# Patient Record
Sex: FEMALE | Race: BLACK OR AFRICAN AMERICAN | NOT HISPANIC OR LATINO | Employment: PART TIME | URBAN - METROPOLITAN AREA
[De-identification: names, ages, dates, MRNs, and addresses within clinical notes are randomized per-mention and may not be internally consistent; named-entity substitution may affect disease eponyms.]

---

## 2021-08-09 ENCOUNTER — OFFICE VISIT (OUTPATIENT)
Dept: FAMILY MEDICINE CLINIC | Facility: CLINIC | Age: 21
End: 2021-08-09

## 2021-08-09 VITALS
OXYGEN SATURATION: 98 % | HEIGHT: 64 IN | SYSTOLIC BLOOD PRESSURE: 122 MMHG | HEART RATE: 70 BPM | BODY MASS INDEX: 39.61 KG/M2 | TEMPERATURE: 97.6 F | WEIGHT: 232 LBS | RESPIRATION RATE: 18 BRPM | DIASTOLIC BLOOD PRESSURE: 86 MMHG

## 2021-08-09 DIAGNOSIS — G89.29 CHRONIC PAIN OF BOTH KNEES: ICD-10-CM

## 2021-08-09 DIAGNOSIS — Z76.89 ENCOUNTER TO ESTABLISH CARE: Primary | ICD-10-CM

## 2021-08-09 DIAGNOSIS — M25.562 CHRONIC PAIN OF BOTH KNEES: ICD-10-CM

## 2021-08-09 DIAGNOSIS — M25.561 CHRONIC PAIN OF BOTH KNEES: ICD-10-CM

## 2021-08-09 PROCEDURE — 99203 OFFICE O/P NEW LOW 30 MIN: CPT | Performed by: NURSE PRACTITIONER

## 2021-08-09 NOTE — PATIENT INSTRUCTIONS
Cont f/u with ortho and PT as scheduled.   Cont low impact exercise, ice, elevation, knee brace as needed.   May cont ibuprofen as needed for pain.   Deferred labs today, cont f/u with GYN as scheduled.   Patient agrees with plan of care and understands instructions. Call if worsening symptoms or any problems or concerns.

## 2021-08-09 NOTE — PROGRESS NOTES
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 She does exercise, states diet is good. Denies smoking. Denies hx of asthma or murmur, she is fasting today.   Sees GYN Dr. Posadas, last visit 6/2021. LMP 7/24/2021, states regular, now more painful, she is not taking OCP.   C/o bilat knee pain L>R, she states she saw ortho Dr. Sutton, [facility] ortho and is going to PT. Last visit 7/29/2021, and recommended to continue with PT for foot and ankle strengthening. She states she has patella tendon pain, she states she has degenerative joint. She was released to work, she states PT does help but still having pain. She states pain throughout the day, improved with activity.       Objective   Vital Signs:   /86 (BP Location: Left arm, Patient Position: Sitting)   Pulse 70   Temp 97.6 °F (36.4 °C) (Infrared)   Resp 18   Ht 162.6 cm (64\")   Wt 105 kg (232 lb)   SpO2 98%   BMI 39.82 kg/m²     Physical Exam  Vitals and nursing note reviewed.   Constitutional:       Appearance: She is well-developed.   HENT:      Head: Normocephalic.   Eyes:      Pupils: Pupils are equal, round, and reactive to light.   Cardiovascular:      Rate and Rhythm: Normal rate and regular rhythm.      Heart sounds: Normal heart sounds.   Pulmonary:      Effort: Pulmonary effort is normal.      Breath sounds: Normal breath sounds.   Musculoskeletal:      Right knee: Swelling present. No effusion. Normal range of motion. Tenderness present over the patellar tendon. Normal pulse.      Left knee: Swelling present. No effusion. Normal range of motion. Tenderness present over the patellar tendon. Normal pulse.   Skin:     General: " Skin is warm and dry.   Neurological:      Mental Status: She is alert and oriented to person, place, and time.   Psychiatric:         Behavior: Behavior normal.         Judgment: Judgment normal.        Result Review :                 Assessment and Plan    Diagnoses and all orders for this visit:    1. Encounter to establish care (Primary)    2. Chronic pain of both knees        Follow Up   Return if symptoms worsen or fail to improve, for Annual physical.  Patient was given instructions and counseling regarding her condition or for health maintenance advice. Please see specific information pulled into the AVS if appropriate.     Cont f/u with ortho and PT as scheduled.   Cont low impact exercise, ice, elevation, knee brace as needed.   May cont ibuprofen as needed for pain.   Deferred labs today, cont f/u with GYN as scheduled.   Patient agrees with plan of care and understands instructions. Call if worsening symptoms or any problems or concerns.

## 2022-08-09 ENCOUNTER — OFFICE VISIT (OUTPATIENT)
Dept: FAMILY MEDICINE CLINIC | Facility: CLINIC | Age: 22
End: 2022-08-09

## 2022-08-09 VITALS
RESPIRATION RATE: 18 BRPM | HEART RATE: 74 BPM | BODY MASS INDEX: 42.43 KG/M2 | SYSTOLIC BLOOD PRESSURE: 140 MMHG | OXYGEN SATURATION: 98 % | WEIGHT: 264 LBS | TEMPERATURE: 97.3 F | DIASTOLIC BLOOD PRESSURE: 80 MMHG | HEIGHT: 66 IN

## 2022-08-09 DIAGNOSIS — N92.0 MENORRHAGIA WITH REGULAR CYCLE: ICD-10-CM

## 2022-08-09 DIAGNOSIS — R53.83 FATIGUE, UNSPECIFIED TYPE: ICD-10-CM

## 2022-08-09 DIAGNOSIS — F41.9 ANXIETY AND DEPRESSION: ICD-10-CM

## 2022-08-09 DIAGNOSIS — Z11.3 SCREEN FOR STD (SEXUALLY TRANSMITTED DISEASE): ICD-10-CM

## 2022-08-09 DIAGNOSIS — Z13.6 SCREENING, ISCHEMIC HEART DISEASE: ICD-10-CM

## 2022-08-09 DIAGNOSIS — N94.3 PMS (PREMENSTRUAL SYNDROME): ICD-10-CM

## 2022-08-09 DIAGNOSIS — Z00.00 ANNUAL PHYSICAL EXAM: Primary | ICD-10-CM

## 2022-08-09 DIAGNOSIS — F32.A ANXIETY AND DEPRESSION: ICD-10-CM

## 2022-08-09 PROCEDURE — 99395 PREV VISIT EST AGE 18-39: CPT | Performed by: NURSE PRACTITIONER

## 2022-08-09 NOTE — PROGRESS NOTES
"Chief Complaint  Wellness Check (Mental and physical health possible pap/seen gyn 2021 results in care everywhere ) and Back Pain    Subjective        Jessica Gauthier presents to [facility]  Patient presents to the office today an annual physical exam.  Patient reports heavy menstrual periods.  She reports taking Motrin for cramping.  Patient will make a follow-up visit with OB/GYN.  Patient is due for physical annual lab work today.  Patient denies any chest pain shortness of air.  She does report intermittent fatigue.  I will check thyroid level for this.  She has anxiety and depression without suicidal or homicidal ideation.  She is stable without medication.  Blood pressure today is 140/80.              Back Pain        Objective   Vital Signs:  /80 (BP Location: Left arm, Patient Position: Sitting)   Pulse 74   Temp 97.3 °F (36.3 °C) (Infrared)   Resp 18   Ht 166.4 cm (65.5\")   Wt 120 kg (264 lb)   SpO2 98%   BMI 43.26 kg/m²   Estimated body mass index is 43.26 kg/m² as calculated from the following:    Height as of this encounter: 166.4 cm (65.5\").    Weight as of this encounter: 120 kg (264 lb).    Class 3 Severe Obesity (BMI >=40). Obesity-related health conditions include the following: none. Obesity is unchanged. BMI is is above average; BMI management plan is completed. We discussed portion control and increasing exercise.      Physical Exam  Constitutional:       General: She is not in acute distress.     Appearance: Normal appearance. She is not ill-appearing, toxic-appearing or diaphoretic.   HENT:      Head: Normocephalic.      Right Ear: Tympanic membrane and external ear normal. There is no impacted cerumen.      Left Ear: Tympanic membrane and external ear normal. There is no impacted cerumen.      Nose: Nose normal. No congestion or rhinorrhea.      Mouth/Throat:      Mouth: Mucous membranes are moist.      Pharynx: Oropharynx is clear. No oropharyngeal " exudate or posterior oropharyngeal erythema.   Eyes:      General:         Right eye: No discharge.         Left eye: No discharge.      Extraocular Movements: Extraocular movements intact.      Conjunctiva/sclera: Conjunctivae normal.      Pupils: Pupils are equal, round, and reactive to light.   Neck:      Vascular: No carotid bruit.   Cardiovascular:      Rate and Rhythm: Normal rate and regular rhythm.      Pulses: Normal pulses.      Heart sounds: Normal heart sounds. No murmur heard.    No friction rub. No gallop.   Pulmonary:      Effort: Pulmonary effort is normal. No respiratory distress.      Breath sounds: Normal breath sounds. No wheezing, rhonchi or rales.   Chest:      Chest wall: No tenderness.   Abdominal:      General: Abdomen is flat. Bowel sounds are normal. There is no distension.      Palpations: Abdomen is soft. There is no mass.      Tenderness: There is no abdominal tenderness. There is no guarding or rebound.      Hernia: No hernia is present.   Musculoskeletal:         General: No swelling or tenderness. Normal range of motion.      Cervical back: Normal range of motion and neck supple. No tenderness.   Skin:     General: Skin is warm and dry.      Coloration: Skin is not jaundiced or pale.      Findings: No erythema or rash.   Neurological:      Mental Status: She is alert and oriented to person, place, and time.      Sensory: No sensory deficit.      Motor: No weakness.      Gait: Gait normal.   Psychiatric:         Mood and Affect: Mood is anxious. Mood is not depressed.         Behavior: Behavior normal.         Thought Content: Thought content normal.         Judgment: Judgment normal.        Result Review :  The following data was reviewed by: PHIL Guerra on 08/09/2022:  Common labs    Common Labsle 8/16/22   WBC 5.20   Hemoglobin 12.6   Hematocrit 38.9   Platelets 410                    Assessment and Plan   Diagnoses and all orders for this visit:    1. Annual physical  exam (Primary)  -     CBC & Differential; Future  -     Comprehensive Metabolic Panel; Future  -     Lipid Panel; Future  -     TSH; Future    2. Menorrhagia with regular cycle  -     CBC & Differential; Future    3. PMS (premenstrual syndrome)    4. Screen for STD (sexually transmitted disease)  -     Chlamydia trachomatis, Neisseria gonorrhoeae, PCR - , Urine, Clean Catch; Future  -     HSV 2 Antibody, IgG; Future  -     RPR; Future  -     Urinalysis With Microscopic - Urine, Clean Catch; Future  -     HIV-1 / O / 2 Ag / Antibody 4th Generation; Future  -     HSV 1 Antibody, IgG; Future    5. Fatigue, unspecified type  -     TSH; Future    6. Screening, ischemic heart disease    7. Anxiety and depression    Counseling was provided on nutrition, physical activity, development, and injury prevention, dental health, and safe sex practices patient verbalizes understanding no additional questions were asked.  Patient to follow-up with OB/GYN for menorrhagia  Patient to work on healthy diet and exercise, limit caffeine.  Advised patient to start yoga and deep breathing exercises for anxiety and depression.       I spent 30 minutes caring for Jessica on this date of service. This time includes time spent by me in the following activities:preparing for the visit, performing a medically appropriate examination and/or evaluation , counseling and educating the patient/family/caregiver, ordering medications, tests, or procedures and documenting information in the medical record  Follow Up   Return in about 1 month (around 9/9/2022), or bring in BP log.  Patient was given instructions and counseling regarding her condition or for health maintenance advice. Please see specific information pulled into the AVS if appropriate.

## 2022-08-16 ENCOUNTER — LAB (OUTPATIENT)
Dept: FAMILY MEDICINE CLINIC | Facility: CLINIC | Age: 22
End: 2022-08-16

## 2022-08-16 DIAGNOSIS — Z11.3 SCREEN FOR STD (SEXUALLY TRANSMITTED DISEASE): ICD-10-CM

## 2022-08-16 DIAGNOSIS — Z00.00 ANNUAL PHYSICAL EXAM: ICD-10-CM

## 2022-08-16 DIAGNOSIS — N92.0 MENORRHAGIA WITH REGULAR CYCLE: ICD-10-CM

## 2022-08-16 DIAGNOSIS — R53.83 FATIGUE, UNSPECIFIED TYPE: ICD-10-CM

## 2022-08-16 LAB
ALBUMIN SERPL-MCNC: 4.3 G/DL (ref 3.5–5.2)
ALBUMIN/GLOB SERPL: 1.8 G/DL
ALP SERPL-CCNC: 91 U/L (ref 39–117)
ALT SERPL W P-5'-P-CCNC: 14 U/L (ref 1–33)
ANION GAP SERPL CALCULATED.3IONS-SCNC: 8.9 MMOL/L (ref 5–15)
AST SERPL-CCNC: 13 U/L (ref 1–32)
BACTERIA UR QL AUTO: ABNORMAL /HPF
BILIRUB SERPL-MCNC: <0.2 MG/DL (ref 0–1.2)
BILIRUB UR QL STRIP: NEGATIVE
BUN SERPL-MCNC: 11 MG/DL (ref 6–20)
BUN/CREAT SERPL: 13.4 (ref 7–25)
CALCIUM SPEC-SCNC: 9.4 MG/DL (ref 8.6–10.5)
CHLORIDE SERPL-SCNC: 105 MMOL/L (ref 98–107)
CHOLEST SERPL-MCNC: 263 MG/DL (ref 0–200)
CLARITY UR: CLEAR
CO2 SERPL-SCNC: 22.1 MMOL/L (ref 22–29)
COLOR UR: YELLOW
CREAT SERPL-MCNC: 0.82 MG/DL (ref 0.57–1)
EGFRCR SERPLBLD CKD-EPI 2021: 104.5 ML/MIN/1.73
ERYTHROCYTE [DISTWIDTH] IN BLOOD BY AUTOMATED COUNT: 14.5 % (ref 12.3–15.4)
GLOBULIN UR ELPH-MCNC: 2.4 GM/DL
GLUCOSE SERPL-MCNC: 93 MG/DL (ref 65–99)
GLUCOSE UR STRIP-MCNC: NEGATIVE MG/DL
HCT VFR BLD AUTO: 38.9 % (ref 34–46.6)
HDLC SERPL-MCNC: 51 MG/DL (ref 40–60)
HGB BLD-MCNC: 12.6 G/DL (ref 12–15.9)
HGB UR QL STRIP.AUTO: ABNORMAL
HIV1+2 AB SER QL: NORMAL
KETONES UR QL STRIP: NEGATIVE
LDLC SERPL CALC-MCNC: 202 MG/DL (ref 0–100)
LDLC/HDLC SERPL: 3.91 {RATIO}
LEUKOCYTE ESTERASE UR QL STRIP.AUTO: NEGATIVE
LYMPHOCYTES # BLD AUTO: 2.4 10*3/MM3 (ref 0.7–3.1)
LYMPHOCYTES NFR BLD AUTO: 46.3 % (ref 19.6–45.3)
MCH RBC QN AUTO: 28.4 PG (ref 26.6–33)
MCHC RBC AUTO-ENTMCNC: 32.4 G/DL (ref 31.5–35.7)
MCV RBC AUTO: 87.7 FL (ref 79–97)
MONOCYTES # BLD AUTO: 0.2 10*3/MM3 (ref 0.1–0.9)
MONOCYTES NFR BLD AUTO: 4.2 % (ref 5–12)
NEUTROPHILS NFR BLD AUTO: 2.6 10*3/MM3 (ref 1.7–7)
NEUTROPHILS NFR BLD AUTO: 49.5 % (ref 42.7–76)
NITRITE UR QL STRIP: NEGATIVE
PH UR STRIP.AUTO: <=5 [PH] (ref 4.6–8)
PLATELET # BLD AUTO: 410 10*3/MM3 (ref 140–450)
PMV BLD AUTO: 7.4 FL (ref 6–12)
POTASSIUM SERPL-SCNC: 4.4 MMOL/L (ref 3.5–5.2)
PROT SERPL-MCNC: 6.7 G/DL (ref 6–8.5)
PROT UR QL STRIP: NEGATIVE
RBC # BLD AUTO: 4.43 10*6/MM3 (ref 3.77–5.28)
RBC # UR STRIP: ABNORMAL /HPF
REF LAB TEST METHOD: ABNORMAL
RPR SER QL: NORMAL
SODIUM SERPL-SCNC: 136 MMOL/L (ref 136–145)
SP GR UR STRIP: 1.02 (ref 1–1.03)
SQUAMOUS #/AREA URNS HPF: ABNORMAL /HPF
TRIGL SERPL-MCNC: 64 MG/DL (ref 0–150)
TSH SERPL DL<=0.05 MIU/L-ACNC: 2.36 UIU/ML (ref 0.27–4.2)
UROBILINOGEN UR QL STRIP: ABNORMAL
VLDLC SERPL-MCNC: 10 MG/DL (ref 5–40)
WBC # UR STRIP: ABNORMAL /HPF
WBC NRBC COR # BLD: 5.2 10*3/MM3 (ref 3.4–10.8)

## 2022-08-16 PROCEDURE — G0432 EIA HIV-1/HIV-2 SCREEN: HCPCS | Performed by: NURSE PRACTITIONER

## 2022-08-16 PROCEDURE — 80061 LIPID PANEL: CPT | Performed by: NURSE PRACTITIONER

## 2022-08-16 PROCEDURE — 81001 URINALYSIS AUTO W/SCOPE: CPT | Performed by: NURSE PRACTITIONER

## 2022-08-16 PROCEDURE — 36415 COLL VENOUS BLD VENIPUNCTURE: CPT | Performed by: NURSE PRACTITIONER

## 2022-08-16 PROCEDURE — 80053 COMPREHEN METABOLIC PANEL: CPT | Performed by: NURSE PRACTITIONER

## 2022-08-16 PROCEDURE — 86592 SYPHILIS TEST NON-TREP QUAL: CPT | Performed by: NURSE PRACTITIONER

## 2022-08-16 PROCEDURE — 85025 COMPLETE CBC W/AUTO DIFF WBC: CPT | Performed by: NURSE PRACTITIONER

## 2022-08-16 PROCEDURE — 84443 ASSAY THYROID STIM HORMONE: CPT | Performed by: NURSE PRACTITIONER

## 2022-08-17 LAB
C TRACH RRNA SPEC QL NAA+PROBE: NEGATIVE
HSV1 IGG SER IA-ACNC: <0.91 INDEX (ref 0–0.9)
HSV2 IGG SER IA-ACNC: <0.91 INDEX (ref 0–0.9)
N GONORRHOEA RRNA SPEC QL NAA+PROBE: NEGATIVE

## 2024-10-15 ENCOUNTER — OFFICE VISIT (OUTPATIENT)
Dept: OBSTETRICS AND GYNECOLOGY | Age: 24
End: 2024-10-15
Payer: COMMERCIAL

## 2024-10-15 VITALS
WEIGHT: 287.8 LBS | DIASTOLIC BLOOD PRESSURE: 82 MMHG | BODY MASS INDEX: 46.25 KG/M2 | SYSTOLIC BLOOD PRESSURE: 124 MMHG | HEIGHT: 66 IN

## 2024-10-15 DIAGNOSIS — F41.9 ANXIETY AND DEPRESSION: ICD-10-CM

## 2024-10-15 DIAGNOSIS — Z23 FLU VACCINE NEED: ICD-10-CM

## 2024-10-15 DIAGNOSIS — Z34.01 ENCOUNTER FOR SUPERVISION OF NORMAL FIRST PREGNANCY, FIRST TRIMESTER: ICD-10-CM

## 2024-10-15 DIAGNOSIS — O21.9 NAUSEA/VOMITING IN PREGNANCY: ICD-10-CM

## 2024-10-15 DIAGNOSIS — Z01.419 ENCOUNTER FOR GYNECOLOGICAL EXAMINATION WITHOUT ABNORMAL FINDING: Primary | ICD-10-CM

## 2024-10-15 DIAGNOSIS — F32.A ANXIETY AND DEPRESSION: ICD-10-CM

## 2024-10-15 DIAGNOSIS — Z34.91 PRENATAL CARE IN FIRST TRIMESTER: ICD-10-CM

## 2024-10-15 DIAGNOSIS — O99.210 OBESITY IN PREGNANCY, ANTEPARTUM: ICD-10-CM

## 2024-10-15 DIAGNOSIS — Z3A.01 7 WEEKS GESTATION OF PREGNANCY: ICD-10-CM

## 2024-10-15 DIAGNOSIS — E78.00 HYPERCHOLESTEROLEMIA: ICD-10-CM

## 2024-10-15 LAB
BILIRUB BLD-MCNC: NEGATIVE MG/DL
CLARITY, POC: CLEAR
COLOR UR: YELLOW
GLUCOSE UR STRIP-MCNC: NEGATIVE MG/DL
KETONES UR QL: NEGATIVE
LEUKOCYTE EST, POC: NEGATIVE
NITRITE UR-MCNC: NEGATIVE MG/ML
PH UR: 6.5 [PH] (ref 5–8)
PROT UR STRIP-MCNC: ABNORMAL MG/DL
RBC # UR STRIP: ABNORMAL /UL
SP GR UR: 1.03 (ref 1–1.03)
UROBILINOGEN UR QL: ABNORMAL

## 2024-10-15 RX ORDER — DIPHENHYDRAMINE HYDROCHLORIDE 25 MG/1
25 CAPSULE ORAL DAILY
Qty: 90 TABLET | Refills: 2 | Status: SHIPPED | OUTPATIENT
Start: 2024-10-15

## 2024-10-15 NOTE — ASSESSMENT & PLAN NOTE
Reviewed diet and exercise recommendations in pregnancy.  Reviewed goal total weight gain of 20 pounds.

## 2024-10-15 NOTE — PROGRESS NOTES
[facility]   Obstetrics and Gynecology   Routine Annual Visit    10/15/2024    Patient: Jessica Gauthier          MR#:[id number]    History of Present Illness    Chief Complaint   Patient presents with    Gynecologic Exam     New Gyn, last annual 22, US for fetal viability, LMP 24, pt c/o cramping, Hx of Endometriosis.     24 y.o. female  who presents for annual exam and viability US.    Patient is having nausea daily and has had a few episodes of vomiting.  She is not taking anything for this.  Does have baseline constipation but is having hard bowel movements around twice daily.  Denies vaginal bleeding, vaginal discharge.  Does have some pelvic discomfort and she has had difficulty knowing if this is related to her endometriosis or to early pregnancy.    Of note, father of baby entered foster care at the age of 10 and has limited family history.  He does note that he has 2 brothers with autism.    Pap 2022 NILM - in CareEverywhere    Obstetric History:  OB History          1    Para        Term                AB        Living             SAB        IAB        Ectopic        Molar        Multiple        Live Births                   Menstrual History:     Patient's last menstrual period was 2024 (exact date).       Sexual History:   Monogamous sexual relationship     Concern for IPV: denies  Dyspareunia: would have some after sex, not during, only sometimes, worse around time of menses / ovulation  Breast concerns: denies  Urinary incontinence: denies  Fecal/gas incontinence: denies  Concern for STI?: denies  ________________________________________  Patient Active Problem List   Diagnosis    Menorrhagia with regular cycle    PMS (premenstrual syndrome)    Screening, ischemic heart disease    Fatigue    Anxiety and depression     Past Medical History:   Diagnosis Date    Anxiety     regulating / getting better    Depression     Not currently. not diagnosed     "Endometriosis     diagnosed aug 2023. laparoscopic surgery    Fibroid     found in aug 2023 in laparoscopy    Hyperlipidemia Always seen this in my results from a young age    Migraine     chronic. not medicated    PMS (premenstrual syndrome)      Past Surgical History:   Procedure Laterality Date    FULGURATION ENDOMETRIOSIS  aug 24    laparoscopy    HYSTEROSCOPY      WISDOM TOOTH EXTRACTION  2019     Social History     Tobacco Use   Smoking Status Never   Smokeless Tobacco Not on file     Family History   Problem Relation Age of Onset    Kidney disease Father     Pancreatic cancer Maternal Grandmother     Diabetes Maternal Grandmother     Breast cancer Maternal Grandmother 50    Prostate cancer Maternal Grandfather         just surgery    Diabetes Maternal Grandfather     Breast cancer Maternal Aunt 40 - 49    Uterine cancer Neg Hx     Ovarian cancer Neg Hx     Colon cancer Neg Hx      Prior to Admission medications    Not on File     ________________________________________    Current contraception: none  History of abnormal Pap smear: denies  Received Gardasil immunization:  unsure - is going to ask mother  History of abnormal mammogram: n/a    Review of Systems   Constitutional:  Negative for chills and fever.   Gastrointestinal:  Positive for constipation, nausea and vomiting. Negative for abdominal pain and diarrhea.   Genitourinary:  Negative for dysuria, frequency, urgency, vaginal bleeding and vaginal discharge.          Objective     /82   Ht 166.4 cm (65.51\")   Wt 131 kg (287 lb 12.8 oz)   LMP 08/27/2024 (Exact Date)   BMI 47.15 kg/m²    BP Readings from Last 3 Encounters:   10/15/24 124/82   08/09/22 140/80   08/09/21 122/86      Wt Readings from Last 3 Encounters:   10/15/24 131 kg (287 lb 12.8 oz)   08/09/22 120 kg (264 lb)   08/09/21 105 kg (232 lb)        BMI: Estimated body mass index is 47.15 kg/m² as calculated from the following:    Height as of this encounter: 166.4 cm (65.51\").    " Weight as of this encounter: 131 kg (287 lb 12.8 oz).    Physical Exam  Vitals and nursing note reviewed.   Constitutional:       General: She is not in acute distress.     Appearance: Normal appearance.   HENT:      Head: Normocephalic and atraumatic.   Eyes:      Extraocular Movements: Extraocular movements intact.   Cardiovascular:      Rate and Rhythm: Normal rate and regular rhythm.   Pulmonary:      Effort: Pulmonary effort is normal. No respiratory distress.   Chest:      Comments: Declined by patient  Abdominal:      General: There is no distension.      Palpations: Abdomen is soft. There is no mass.      Tenderness: There is no abdominal tenderness.   Musculoskeletal:         General: No swelling. Normal range of motion.      Cervical back: Normal range of motion.   Skin:     General: Skin is warm and dry.   Neurological:      General: No focal deficit present.      Mental Status: She is alert and oriented to person, place, and time.   Psychiatric:         Mood and Affect: Mood normal.         Behavior: Behavior normal.       TVUS 10/15/2024   Impression    1.  Intrauterine pregnancy measuring 7w6d not consistent with LMP  2.  Viable fetus,   3.  EDC: Estimated Date of Delivery: 2025 by US today    Assessment:  Jessica Gauthier is a 24 y.o.  presenting for well woman exam and viability US.    Diagnoses and all orders for this visit:    1. Encounter for gynecological examination without abnormal finding (Primary)    2. Prenatal care in first trimester  -     Urine Culture - Urine, Urine, Clean Catch  -     Chlamydia trachomatis, Neisseria gonorrhoeae, Trichomonas vaginalis, PCR - Urine, Urine, Clean Catch  -     OB Panel With HIV and RPR  -     Varicella Zoster Antibody, IgG  -     Hemoglobin A1c  -     Hepatitis C Antibody  -     Rubella Antibody, IgG  -     Hemoglobinopathy Fractionation Cascade; Future  -     Hemoglobinopathy Fractionation Cascade    3. 7 weeks gestation of pregnancy  -     POC  Urinalysis Dipstick    4. Nausea/vomiting in pregnancy  Assessment & Plan:  Rx B6, unisom. Recommended amador prn.    Orders:  -     vitamin B-6 (PYRIDOXINE) 25 MG tablet; Take 1 tablet by mouth Daily.  Dispense: 90 tablet; Refill: 2  -     doxylamine (UNISOM) 25 MG tablet; Take 1 tablet by mouth Every Night.  Dispense: 60 tablet; Refill: 1    5. Flu vaccine need  -     Fluzone >6mos (6904-2019)    6. Hypercholesterolemia    7. Encounter for supervision of normal first pregnancy, first trimester  Overview:  -PNL: collect today  -Pap: 8/2022 NILM  -Genetics: CF/SMA/NIPS at next visit, plan for anatomy Us at 18-22 wga  -Imm: titers today, tdap > 27wga, counseled on covid vaccine  -Contraception: discuss at future visit  -Birthplan: discuss at future visit  -Care coordination: will discuss blood transfusions, latex allergy - rash, call schedule to be reviewed        8. Obesity in pregnancy, antepartum  Assessment & Plan:  Reviewed diet and exercise recommendations in pregnancy.  Reviewed goal total weight gain of 20 pounds.      9. Anxiety and depression  Overview:  Previously took hydroxyzine qhs prn. Reports mood stable currently.        Recommended flu/COVID vaccination. Flu vaccine given in office today.    Plan:  Return in about 4 weeks (around 11/12/2024) for initial OB.    Rachel Wilcox MD  10/15/2024 11:00 EDT

## 2024-10-16 PROBLEM — Z28.39 SUSCEPTIBLE TO VARICELLA (NON-IMMUNE), CURRENTLY PREGNANT: Status: ACTIVE | Noted: 2024-10-16

## 2024-10-16 PROBLEM — O09.899 SUSCEPTIBLE TO VARICELLA (NON-IMMUNE), CURRENTLY PREGNANT: Status: ACTIVE | Noted: 2024-10-16

## 2024-10-16 PROBLEM — R73.03 PREDIABETES: Status: ACTIVE | Noted: 2024-10-16

## 2024-10-16 LAB
ABO GROUP BLD: NORMAL
BASOPHILS # BLD AUTO: 0 X10E3/UL (ref 0–0.2)
BASOPHILS NFR BLD AUTO: 1 %
BLD GP AB SCN SERPL QL: NEGATIVE
EOSINOPHIL # BLD AUTO: 0.1 X10E3/UL (ref 0–0.4)
EOSINOPHIL NFR BLD AUTO: 1 %
ERYTHROCYTE [DISTWIDTH] IN BLOOD BY AUTOMATED COUNT: 13.4 % (ref 11.7–15.4)
HBA1C MFR BLD: 5.8 % (ref 4.8–5.6)
HBV SURFACE AG SERPL QL IA: NEGATIVE
HCT VFR BLD AUTO: 43.3 % (ref 34–46.6)
HCV AB SERPL QL IA: NORMAL
HCV IGG SERPL QL IA: NON REACTIVE
HCV IGG SERPL QL IA: NORMAL
HGB A MFR BLD ELPH: 97.6 % (ref 96.4–98.8)
HGB A2 MFR BLD ELPH: 2.4 % (ref 1.8–3.2)
HGB BLD-MCNC: 14 G/DL (ref 11.1–15.9)
HGB F MFR BLD ELPH: 0 % (ref 0–2)
HGB FRACT BLD-IMP: NORMAL
HGB S MFR BLD ELPH: 0 %
HIV 1+2 AB+HIV1 P24 AG SERPL QL IA: NON REACTIVE
IMM GRANULOCYTES # BLD AUTO: 0 X10E3/UL (ref 0–0.1)
IMM GRANULOCYTES NFR BLD AUTO: 0 %
LYMPHOCYTES # BLD AUTO: 1.8 X10E3/UL (ref 0.7–3.1)
LYMPHOCYTES NFR BLD AUTO: 29 %
MCH RBC QN AUTO: 29.5 PG (ref 26.6–33)
MCHC RBC AUTO-ENTMCNC: 32.3 G/DL (ref 31.5–35.7)
MCV RBC AUTO: 91 FL (ref 79–97)
MONOCYTES # BLD AUTO: 0.4 X10E3/UL (ref 0.1–0.9)
MONOCYTES NFR BLD AUTO: 6 %
NEUTROPHILS # BLD AUTO: 3.9 X10E3/UL (ref 1.4–7)
NEUTROPHILS NFR BLD AUTO: 63 %
PLATELET # BLD AUTO: 389 X10E3/UL (ref 150–450)
RBC # BLD AUTO: 4.75 X10E6/UL (ref 3.77–5.28)
RH BLD: POSITIVE
RPR SER QL: NON REACTIVE
RUBV IGG SERPL IA-ACNC: 3.01 INDEX
VZV IGG SER QL IA: NON REACTIVE
WBC # BLD AUTO: 6.2 X10E3/UL (ref 3.4–10.8)

## 2024-10-17 LAB
BACTERIA UR CULT: NORMAL
BACTERIA UR CULT: NORMAL
C TRACH RRNA SPEC QL NAA+PROBE: NEGATIVE
N GONORRHOEA RRNA SPEC QL NAA+PROBE: NEGATIVE
T VAGINALIS RRNA SPEC QL NAA+PROBE: NEGATIVE

## 2024-11-12 ENCOUNTER — INITIAL PRENATAL (OUTPATIENT)
Dept: OBSTETRICS AND GYNECOLOGY | Age: 24
End: 2024-11-12
Payer: COMMERCIAL

## 2024-11-12 VITALS — BODY MASS INDEX: 46.88 KG/M2 | DIASTOLIC BLOOD PRESSURE: 86 MMHG | SYSTOLIC BLOOD PRESSURE: 124 MMHG | WEIGHT: 286.2 LBS

## 2024-11-12 DIAGNOSIS — R73.03 PREDIABETES: ICD-10-CM

## 2024-11-12 DIAGNOSIS — Z34.01 ENCOUNTER FOR SUPERVISION OF NORMAL FIRST PREGNANCY, FIRST TRIMESTER: Primary | ICD-10-CM

## 2024-11-12 DIAGNOSIS — Z28.39 SUSCEPTIBLE TO VARICELLA (NON-IMMUNE), CURRENTLY PREGNANT: ICD-10-CM

## 2024-11-12 DIAGNOSIS — F41.9 ANXIETY AND DEPRESSION: ICD-10-CM

## 2024-11-12 DIAGNOSIS — E78.00 HYPERCHOLESTEROLEMIA: ICD-10-CM

## 2024-11-12 DIAGNOSIS — O09.899 SUSCEPTIBLE TO VARICELLA (NON-IMMUNE), CURRENTLY PREGNANT: ICD-10-CM

## 2024-11-12 DIAGNOSIS — F32.A ANXIETY AND DEPRESSION: ICD-10-CM

## 2024-11-12 DIAGNOSIS — N89.8 VAGINAL IRRITATION: ICD-10-CM

## 2024-11-12 DIAGNOSIS — O99.210 OBESITY IN PREGNANCY, ANTEPARTUM: ICD-10-CM

## 2024-11-12 LAB
CLARITY, POC: CLEAR
COLOR UR: YELLOW
EXTERNAL NIPT: NORMAL
GLUCOSE UR STRIP-MCNC: NEGATIVE MG/DL
PROT UR STRIP-MCNC: NEGATIVE MG/DL

## 2024-11-12 RX ORDER — ASPIRIN 81 MG/1
81 TABLET ORAL DAILY
Qty: 90 TABLET | Refills: 1 | Status: SHIPPED | OUTPATIENT
Start: 2024-11-12

## 2024-11-12 NOTE — PROGRESS NOTES
"[facility]   Obstetrics and Gynecology   New Obstetric Visit    2024    Patient: Jessica Gauthier          MR#:[id number]    History of Present Illness    Chief Complaint   Patient presents with    Initial Prenatal Visit     OB check, Genetic testing, pt has no complaints today     24 y.o. female  at 11w6d presents for NOB visit.  She is doing well today.  Taking prenatal vitamins.    Vitamin B6 has helped her nausea.     Having some indigestion / \"bubbling\" in her stomach sometimes.    Having itchiness / burning in vagina since approximately . Thinks she may have yeast infection.  ________________________________________  Patient Active Problem List   Diagnosis    Menorrhagia with regular cycle    PMS (premenstrual syndrome)    Screening, ischemic heart disease    Fatigue    Anxiety and depression    Nausea/vomiting in pregnancy    Encounter for supervision of normal first pregnancy, first trimester    Hypercholesterolemia    Obesity in pregnancy, antepartum    Prediabetes    Susceptible to varicella (non-immune), currently pregnant     OB History          1    Para        Term                AB        Living             SAB        IAB        Ectopic        Molar        Multiple        Live Births                  Social History:  Denies h/o herpes, syphilis, HIV    Past Medical History:   Diagnosis Date    Anxiety     regulating / getting better    Depression     Not currently. not diagnosed    Endometriosis     diagnosed aug 2023. laparoscopic surgery    Fibroid     found in aug 2023 in laparoscopy    Hyperlipidemia Always seen this in my results from a young age    Migraine     chronic. not medicated    PMS (premenstrual syndrome)      Past Surgical History:   Procedure Laterality Date    FULGURATION ENDOMETRIOSIS  aug 23    laparoscopy    HYSTEROSCOPY      WISDOM TOOTH EXTRACTION       Social History     Tobacco Use   Smoking Status Never   Smokeless Tobacco Not on " "file     Family History   Problem Relation Age of Onset    Kidney disease Father     Pancreatic cancer Maternal Grandmother     Diabetes Maternal Grandmother     Breast cancer Maternal Grandmother 50    Prostate cancer Maternal Grandfather         just surgery    Diabetes Maternal Grandfather     Breast cancer Maternal Aunt 40 - 49    Uterine cancer Neg Hx     Ovarian cancer Neg Hx     Colon cancer Neg Hx      Prior to Admission medications    Medication Sig Start Date End Date Taking? Authorizing Provider   doxylamine (UNISOM) 25 MG tablet Take 1 tablet by mouth Every Night. 10/15/24   Rachel Wilcox MD   Prenatal Vit-Fe Fumarate-FA (PRENATAL VITAMIN PO) Take  by mouth.    Provider, MD [place]   vitamin B-6 (PYRIDOXINE) 25 MG tablet Take 1 tablet by mouth Daily. 10/15/24   Rachel Wilcox MD     ________________________________________    Review of Systems   Constitutional:  Negative for chills and fever.   Gastrointestinal:  Negative for abdominal pain, constipation, diarrhea, nausea and vomiting.   Genitourinary:  Negative for dysuria, frequency and urgency.          Objective     /86   Wt 130 kg (286 lb 3.2 oz)   LMP 08/27/2024 (Exact Date)   BMI 46.88 kg/m²    BP Readings from Last 3 Encounters:   11/12/24 124/86   10/15/24 124/82   08/09/22 140/80      Wt Readings from Last 3 Encounters:   11/12/24 130 kg (286 lb 3.2 oz)   10/15/24 131 kg (287 lb 12.8 oz)   08/09/22 120 kg (264 lb)        BMI: Estimated body mass index is 46.88 kg/m² as calculated from the following:    Height as of 10/15/24: 166.4 cm (65.51\").    Weight as of this encounter: 130 kg (286 lb 3.2 oz).    Physical Exam  Vitals and nursing note reviewed.   Constitutional:       General: She is not in acute distress.     Appearance: Normal appearance.   HENT:      Head: Normocephalic and atraumatic.   Eyes:      Extraocular Movements: Extraocular movements intact.   Cardiovascular:      Rate and Rhythm: Normal rate and regular rhythm. "   Pulmonary:      Effort: Pulmonary effort is normal. No respiratory distress.   Abdominal:      General: There is no distension.      Palpations: Abdomen is soft. There is no mass.      Tenderness: There is no abdominal tenderness.   Musculoskeletal:         General: No swelling. Normal range of motion.      Cervical back: Normal range of motion.   Skin:     General: Skin is warm and dry.   Neurological:      General: No focal deficit present.      Mental Status: She is alert and oriented to person, place, and time.   Psychiatric:         Mood and Affect: Mood normal.         Behavior: Behavior normal.       Assessment:  Jessica Gauthier is a 24 y.o.  at 11w6d presenting for new OB visit.    Diagnoses and all orders for this visit:    1. Encounter for supervision of normal first pregnancy, first trimester (Primary)  Overview:  -PNL: A1c 5.8, otherwise unremarkable  -Pap: 2022 NILM  -Genetics: CF/SMA/NIPS collected 2024 , plan for anatomy Us at 18-22 wga  -Imm: RI/VNI, tdap > 27wga, counseled on covid vaccine  -Contraception: discuss at future visit  -Birthplan: discuss at future visit  -Care coordination: accepts blood transfusions, latex allergy - rash, call schedule reviewed      Orders:  -     POC Urinalysis Dipstick  -     Jose David Panorama Prenatal Test: Chromosomes 13, 18, 21, X & Y: Triploidy 22Q.11.2 Deletion - Blood,; Future  -     Jose David Horizon 2(CF & SMA) - Blood,; Future  -     aspirin 81 MG EC tablet; Take 1 tablet by mouth Daily.  Dispense: 90 tablet; Refill: 1  -     Jose David Horizon 2(CF & SMA) - Blood,  -     Jose David Panorama Prenatal Test: Chromosomes 13, 18, 21, X & Y: Triploidy 22Q.11.2 Deletion - Blood,    2. Vaginal irritation  -     NuSwab BV & Candida - Swab, Vagina    3. Hypercholesterolemia    4. Prediabetes  Assessment & Plan:  Discussed need for 1hr GTT. Will complete at earliest convenience.      5. Obesity in pregnancy, antepartum  Assessment & Plan:  Weight stable since last  visit. Incorporating dietary changes.      6. Susceptible to varicella (non-immune), currently pregnant    7. Anxiety and depression  Overview:  Previously took hydroxyzine qhs prn. Reports mood stable currently.        Plan:  Return in about 4 weeks (around 12/10/2024) for F/u prenatal 4 wk, also needs 1hr GTT at her convenience.    Rachel Wilcox MD  11/12/2024 09:23 EST

## 2024-11-13 ENCOUNTER — TELEPHONE (OUTPATIENT)
Dept: OBSTETRICS AND GYNECOLOGY | Age: 24
End: 2024-11-13
Payer: COMMERCIAL

## 2024-11-13 NOTE — TELEPHONE ENCOUNTER
Caller: Jessica Gauthier    Relationship: Self    Best call back number: [phone]     What is the best time to reach you: CALL ANYTIME. IT IS OKAY TO LVM.    Who are you requesting to speak with (clinical staff, provider,  specific staff member): FIRST AVAILABLE    Do you know the name of the person who called: PATIENT BELIEVES NAME ON VOICEMAIL IS CRESENCIO     What was the call regarding: NO DETAILS ON VM, POSSIBLE LAB RESULTS.    Is it okay if the provider responds through [employer]: PLEASE CALL

## 2024-11-14 ENCOUNTER — TELEPHONE (OUTPATIENT)
Dept: OBSTETRICS AND GYNECOLOGY | Age: 24
End: 2024-11-14
Payer: COMMERCIAL

## 2024-11-14 DIAGNOSIS — B37.31 VULVOVAGINAL CANDIDIASIS: Primary | ICD-10-CM

## 2024-11-14 LAB
A VAGINAE DNA VAG QL NAA+PROBE: ABNORMAL SCORE
BVAB2 DNA VAG QL NAA+PROBE: ABNORMAL SCORE
C ALBICANS DNA VAG QL NAA+PROBE: POSITIVE
C GLABRATA DNA VAG QL NAA+PROBE: NEGATIVE
MEGA1 DNA VAG QL NAA+PROBE: ABNORMAL SCORE

## 2024-11-14 RX ORDER — MICONAZOLE NITRATE 2 %
1 CREAM WITH APPLICATOR VAGINAL NIGHTLY
Qty: 45 G | Refills: 0 | Status: SHIPPED | OUTPATIENT
Start: 2024-11-14

## 2024-11-14 NOTE — TELEPHONE ENCOUNTER
Please call patient and inform she tested positive for a yeast infection. I have sent an anti-fungal medication to the pharmacy for her to use for 7 days nightly.

## 2024-11-18 LAB
Lab: NORMAL
NTRA FETAL FRACTION: NORMAL
NTRA GENDER OF FETUS: NORMAL
NTRA MONOSOMY X AGE-BASED RISK TEXT: NORMAL
NTRA MONOSOMY X RESULT TEXT: NORMAL
NTRA MONOSOMY X RISK SCORE TEXT: NORMAL
NTRA TRIPLOIDY RESULT TEXT: NORMAL
NTRA TRISOMY 13 AGE-BASED RISK TEXT: NORMAL
NTRA TRISOMY 13 RESULT TEXT: NORMAL
NTRA TRISOMY 13 RISK SCORE TEXT: NORMAL
NTRA TRISOMY 18 AGE-BASED RISK TEXT: NORMAL
NTRA TRISOMY 18 RESULT TEXT: NORMAL
NTRA TRISOMY 18 RISK SCORE TEXT: NORMAL
NTRA TRISOMY 21 AGE-BASED RISK TEXT: NORMAL
NTRA TRISOMY 21 RESULT TEXT: NORMAL
NTRA TRISOMY 21 RISK SCORE TEXT: NORMAL

## 2024-11-23 LAB
Lab: ABNORMAL
Lab: ABNORMAL
Lab: POSITIVE
NTRA CYSTIC FIBROSIS: NEGATIVE
NTRA SPINAL MUSCULAR ATROPHY: POSITIVE

## 2024-11-26 PROBLEM — Z14.8 CARRIER OF SPINAL MUSCULAR ATROPHY: Status: ACTIVE | Noted: 2024-11-26

## 2024-12-11 ENCOUNTER — ROUTINE PRENATAL (OUTPATIENT)
Dept: OBSTETRICS AND GYNECOLOGY | Age: 24
End: 2024-12-11
Payer: COMMERCIAL

## 2024-12-11 VITALS — WEIGHT: 288.6 LBS | BODY MASS INDEX: 47.28 KG/M2 | DIASTOLIC BLOOD PRESSURE: 76 MMHG | SYSTOLIC BLOOD PRESSURE: 122 MMHG

## 2024-12-11 DIAGNOSIS — Z14.8 CARRIER OF SPINAL MUSCULAR ATROPHY: ICD-10-CM

## 2024-12-11 DIAGNOSIS — Z28.39 SUSCEPTIBLE TO VARICELLA (NON-IMMUNE), CURRENTLY PREGNANT: ICD-10-CM

## 2024-12-11 DIAGNOSIS — F41.9 ANXIETY AND DEPRESSION: ICD-10-CM

## 2024-12-11 DIAGNOSIS — F32.A ANXIETY AND DEPRESSION: ICD-10-CM

## 2024-12-11 DIAGNOSIS — Z34.01 ENCOUNTER FOR SUPERVISION OF NORMAL FIRST PREGNANCY, FIRST TRIMESTER: ICD-10-CM

## 2024-12-11 DIAGNOSIS — Z3A.16 16 WEEKS GESTATION OF PREGNANCY: Primary | ICD-10-CM

## 2024-12-11 DIAGNOSIS — O09.899 SUSCEPTIBLE TO VARICELLA (NON-IMMUNE), CURRENTLY PREGNANT: ICD-10-CM

## 2024-12-11 DIAGNOSIS — R73.03 PREDIABETES: ICD-10-CM

## 2024-12-11 LAB
GLUCOSE UR STRIP-MCNC: NEGATIVE MG/DL
PROT UR STRIP-MCNC: NEGATIVE MG/DL

## 2024-12-11 NOTE — PROGRESS NOTES
Jessica Gauthier, a 24 y.o.  at 16w0d, presents for OB follow-up.  She is doing well today.  Denies loss of fluid, vaginal bleeding, and contractions.  Morning sickness has improved somewhat.  She is having nipple sensitivity.     Objective  BP Readings from Last 3 Encounters:   24 122/76   24 124/86   10/15/24 124/82      Wt Readings from Last 3 Encounters:   24 131 kg (288 lb 9.6 oz)   24 130 kg (286 lb 3.2 oz)   10/15/24 131 kg (287 lb 12.8 oz)      Total weight gain this pregnancy: Not found.    Review of systems:   Gen: negative  CV:     negative  GI: nausea and vomiting   :   negative  MS:    negative  Neuro: negative and denies headaches and visual changes   Pul: negative    A/P  Diagnoses and all orders for this visit:    1. 16 weeks gestation of pregnancy (Primary)  -     POC Urinalysis Dipstick    2. Prediabetes    3. Encounter for supervision of normal first pregnancy, first trimester  Overview:  -PNL: A1c 5.8, otherwise unremarkable  -Pap: 2022 NILM  -Genetics: CF/SMA/NIPS collected 2024 , plan for anatomy Us at 18-22 wga  -Imm: RI/VNI, tdap > 27wga, counseled on covid vaccine  -Contraception: discuss at future visit  -Birthplan: discuss at future visit  -Care coordination: accepts blood transfusions, latex allergy - rash, call schedule reviewed        4. Susceptible to varicella (non-immune), currently pregnant    5. Anxiety and depression  Overview:  Previously took hydroxyzine qhs prn. Reports mood stable currently.      6. Carrier of spinal muscular atrophy  Overview:  Partner testing sent       Early one hour was normal  Continue ASA   SMA testing sent today  Nutrition and weight gain were addressed.    Return in about 4 weeks (around 2025), or anatomy scan with Dr Chiu.    PHIL [place]

## 2025-01-06 ENCOUNTER — TELEPHONE (OUTPATIENT)
Dept: OBSTETRICS AND GYNECOLOGY | Age: 25
End: 2025-01-06
Payer: COMMERCIAL

## 2025-01-06 NOTE — TELEPHONE ENCOUNTER
Hub staff attempted to follow warm transfer process and was unsuccessful     Caller: Jessica Gauthier    Relationship to patient: Self    Best call back number: [phone] / LVM    Patient is needing: OB PT WANTING TO R/S ANATOMY U/S AND OB F/U ON 01/07/25 DUE TO BAD WEATHER - [facility] WAS UNABLE TO FIND AVAILABILITY UNTIL 01/21/25 - PT WANTS TO BE SEEN SOONER THAN THAT    [facility] UNABLE TO WT TO OFFICE DUE TO BEING CLOSED    OFFICE TO CONTACT PT TO R/S    THANK YOU

## 2025-01-07 ENCOUNTER — ROUTINE PRENATAL (OUTPATIENT)
Dept: OBSTETRICS AND GYNECOLOGY | Age: 25
End: 2025-01-07
Payer: COMMERCIAL

## 2025-01-07 VITALS — DIASTOLIC BLOOD PRESSURE: 82 MMHG | BODY MASS INDEX: 47.02 KG/M2 | WEIGHT: 287 LBS | SYSTOLIC BLOOD PRESSURE: 128 MMHG

## 2025-01-07 DIAGNOSIS — Z3A.19 19 WEEKS GESTATION OF PREGNANCY: ICD-10-CM

## 2025-01-07 DIAGNOSIS — F32.A ANXIETY AND DEPRESSION: ICD-10-CM

## 2025-01-07 DIAGNOSIS — O99.210 OBESITY IN PREGNANCY, ANTEPARTUM: Primary | ICD-10-CM

## 2025-01-07 DIAGNOSIS — F41.9 ANXIETY AND DEPRESSION: ICD-10-CM

## 2025-01-07 DIAGNOSIS — Z28.39 SUSCEPTIBLE TO VARICELLA (NON-IMMUNE), CURRENTLY PREGNANT: ICD-10-CM

## 2025-01-07 DIAGNOSIS — Z14.8 CARRIER OF SPINAL MUSCULAR ATROPHY: ICD-10-CM

## 2025-01-07 DIAGNOSIS — O09.899 SUSCEPTIBLE TO VARICELLA (NON-IMMUNE), CURRENTLY PREGNANT: ICD-10-CM

## 2025-01-07 LAB
GLUCOSE UR STRIP-MCNC: NEGATIVE MG/DL
PROT UR STRIP-MCNC: ABNORMAL MG/DL

## 2025-01-07 NOTE — PROGRESS NOTES
Chief Complaint   Patient presents with    Routine Prenatal Visit     19 week ob visit with anatomy scan       HPI: 24 y.o.  at 19w6d gestation  She is doing well  Feels good   Here for anatomy scan today  Accompanied by her partner, Aldo  U/s was normal but incomplete  Needs 3VV, RVOT and spine  Will plan repeat anatomy scan at 28 wks  She is pre diabetic, had early 1 hour gtt that was normal  Plan rpt 1 hour at 28 wks  She is an SMA carrier, partner was tested and was negative. I don't see results in chart but will obtain them.     Vitals:    25 1403   BP: 128/82   Weight: 130 kg (287 lb)       ROS:  GI:  Negative  : na  Pulmonary: Negative     A/P  1. Intrauterine pregnancy at 19w6d   2. Pregnancy Risk:  NORMAL    Diagnoses and all orders for this visit:    1. Obesity in pregnancy, antepartum (Primary)  Comments:  taking ASA daily    2. 19 weeks gestation of pregnancy  -     POC Urinalysis Dipstick, Multipro    3. Susceptible to varicella (non-immune), currently pregnant    4. Carrier of spinal muscular atrophy  Comments:  partner tested negative    5. Anxiety and depression        -----------------------  PLAN:   Return in about 4 weeks (around 2025) for belly check, already scheduled with Dr Chiu. add on u/s to visit in March, needs to complete anatomy.      KRISTY Phan  2025 14:23 EST

## 2025-02-04 ENCOUNTER — ROUTINE PRENATAL (OUTPATIENT)
Dept: OBSTETRICS AND GYNECOLOGY | Age: 25
End: 2025-02-04
Payer: COMMERCIAL

## 2025-02-04 VITALS — DIASTOLIC BLOOD PRESSURE: 74 MMHG | BODY MASS INDEX: 47.18 KG/M2 | SYSTOLIC BLOOD PRESSURE: 128 MMHG | WEIGHT: 288 LBS

## 2025-02-04 DIAGNOSIS — R73.03 PREDIABETES: ICD-10-CM

## 2025-02-04 DIAGNOSIS — Z34.02 ENCOUNTER FOR SUPERVISION OF NORMAL FIRST PREGNANCY IN SECOND TRIMESTER: Primary | ICD-10-CM

## 2025-02-04 DIAGNOSIS — Z14.8 CARRIER OF SPINAL MUSCULAR ATROPHY: ICD-10-CM

## 2025-02-04 DIAGNOSIS — O26.899 CARPAL TUNNEL SYNDROME DURING PREGNANCY: ICD-10-CM

## 2025-02-04 DIAGNOSIS — G56.00 CARPAL TUNNEL SYNDROME DURING PREGNANCY: ICD-10-CM

## 2025-02-04 PROBLEM — O21.9 NAUSEA/VOMITING IN PREGNANCY: Status: RESOLVED | Noted: 2024-10-15 | Resolved: 2025-02-04

## 2025-02-04 PROBLEM — N92.0 MENORRHAGIA WITH REGULAR CYCLE: Status: RESOLVED | Noted: 2022-08-09 | Resolved: 2025-02-04

## 2025-02-04 LAB
CLARITY, POC: CLEAR
COLOR UR: YELLOW
GLUCOSE UR STRIP-MCNC: NEGATIVE MG/DL
PROT UR STRIP-MCNC: NEGATIVE MG/DL

## 2025-02-04 NOTE — PROGRESS NOTES
Chief Complaint   Patient presents with    Routine Prenatal Visit     OB, pt.     HPI- Pt is 24 y.o.  at 23w6d here for prenatal visit.     OB History    Para Term  AB Living   1             SAB IAB Ectopic Molar Multiple Live Births                    # Outcome Date GA Lbr John/2nd Weight Sex Type Anes PTL Lv   1 Current                 Wt Readings from Last 3 Encounters:   25 131 kg (288 lb)   25 130 kg (287 lb)   24 131 kg (288 lb 9.6 oz)     Temp Readings from Last 3 Encounters:   22 97.3 °F (36.3 °C) (Infrared)   21 97.6 °F (36.4 °C) (Infrared)     BP Readings from Last 3 Encounters:   25 128/74   25 128/82   24 122/76     Pulse Readings from Last 3 Encounters:   22 74   21 70        Last OB US Data (since 2024)         Value Time User    Fetal Survey  Incomplete 2025  3:59 PM Man Chiu MD    Placenta location  Anterior 2025  3:59 PM Man Chiu MD             ROS-     - No vaginal bleeding    GI- No abdominal pain    /74   Wt 131 kg (288 lb)   LMP 2024 (Exact Date)   BMI 47.18 kg/m²   Exam - See flow sheet        Assessment-  Diagnoses and all orders for this visit:    1. Encounter for supervision of normal first pregnancy in second trimester (Primary)  -     POC Urinalysis Dipstick    2. Carpal tunnel syndrome during pregnancy       Wrist splints suggested at night while sleeping  3. Carrier of spinal muscular atrophy  Overview:  Partner testing negative      4. Prediabetes       Initial 1 are normal, repeat at next visit dietary modifications on that day reviewed

## 2025-03-04 ENCOUNTER — ROUTINE PRENATAL (OUTPATIENT)
Dept: OBSTETRICS AND GYNECOLOGY | Age: 25
End: 2025-03-04
Payer: COMMERCIAL

## 2025-03-04 VITALS — BODY MASS INDEX: 47.38 KG/M2 | DIASTOLIC BLOOD PRESSURE: 80 MMHG | SYSTOLIC BLOOD PRESSURE: 130 MMHG | WEIGHT: 289.2 LBS

## 2025-03-04 DIAGNOSIS — G56.00 CARPAL TUNNEL SYNDROME DURING PREGNANCY: ICD-10-CM

## 2025-03-04 DIAGNOSIS — F41.9 ANXIETY AND DEPRESSION: ICD-10-CM

## 2025-03-04 DIAGNOSIS — O09.899 SUSCEPTIBLE TO VARICELLA (NON-IMMUNE), CURRENTLY PREGNANT: ICD-10-CM

## 2025-03-04 DIAGNOSIS — Z13.1 SCREENING FOR DIABETES MELLITUS (DM): ICD-10-CM

## 2025-03-04 DIAGNOSIS — R73.03 PREDIABETES: ICD-10-CM

## 2025-03-04 DIAGNOSIS — L29.9 PRURITUS: ICD-10-CM

## 2025-03-04 DIAGNOSIS — Z13.0 SCREENING FOR IRON DEFICIENCY ANEMIA: ICD-10-CM

## 2025-03-04 DIAGNOSIS — O99.210 OBESITY IN PREGNANCY, ANTEPARTUM: ICD-10-CM

## 2025-03-04 DIAGNOSIS — E78.00 HYPERCHOLESTEROLEMIA: ICD-10-CM

## 2025-03-04 DIAGNOSIS — Z14.8 CARRIER OF SPINAL MUSCULAR ATROPHY: ICD-10-CM

## 2025-03-04 DIAGNOSIS — F32.A ANXIETY AND DEPRESSION: ICD-10-CM

## 2025-03-04 DIAGNOSIS — Z3A.27 27 WEEKS GESTATION OF PREGNANCY: ICD-10-CM

## 2025-03-04 DIAGNOSIS — Z28.39 SUSCEPTIBLE TO VARICELLA (NON-IMMUNE), CURRENTLY PREGNANT: ICD-10-CM

## 2025-03-04 DIAGNOSIS — O26.899 CARPAL TUNNEL SYNDROME DURING PREGNANCY: ICD-10-CM

## 2025-03-04 DIAGNOSIS — Z34.02 ENCOUNTER FOR SUPERVISION OF NORMAL FIRST PREGNANCY, SECOND TRIMESTER: Primary | ICD-10-CM

## 2025-03-04 LAB
BILIRUB BLD-MCNC: NEGATIVE MG/DL
CLARITY, POC: CLEAR
COLOR UR: YELLOW
GLUCOSE UR STRIP-MCNC: NEGATIVE MG/DL
KETONES UR QL: NEGATIVE
LEUKOCYTE EST, POC: ABNORMAL
NITRITE UR-MCNC: NEGATIVE MG/ML
PH UR: 7 [PH] (ref 5–8)
PROT UR STRIP-MCNC: ABNORMAL MG/DL
RBC # UR STRIP: ABNORMAL /UL
SP GR UR: 1.02 (ref 1–1.03)
UROBILINOGEN UR QL: ABNORMAL

## 2025-03-04 NOTE — PROGRESS NOTES
[facility]   Obstetrics and Gynecology   Return Obstetric Visit    eJssica Gauthier, a 24 y.o.  at 27w6d, presents for OB follow-up.  She is doing well today.  Denies loss of fluid, vaginal bleeding, and contractions.  Endorses fetal movements.    Patient having some symptoms of carpal tunnel.  She takes all day for her job.  She is using wrist braces sometimes-finds it difficult to tolerate them at night as it makes her feel claustrophobic.    Having some worsening reflux associated with some nausea and rare vomiting.  Has been using Tums.  Declined addition of Pepcid today.    Reports generalized itching, rarely on palms and soles.    Objective  BP Readings from Last 3 Encounters:   25 130/80   25 128/74   25 128/82      Wt Readings from Last 3 Encounters:   25 131 kg (289 lb 3.2 oz)   25 131 kg (288 lb)   25 130 kg (287 lb)      Total weight gain this pregnancy: Not found.    General: Awake, alert, no apparent distress  Respiratory: No increased work of breathing  Abdomen: Fundus soft and nontender  Extremity: Nontender, no edema    Jessica Gauthier is a 24 y.o.  at 27w6d presenting for OB follow-up.    Diagnoses and all orders for this visit:    1. Encounter for supervision of normal first pregnancy, second trimester (Primary)  Overview:  -PNL: A1c 5.8, otherwise unremarkable  -Pap: 2022 NILM  -Genetics: SMA carrier, LR/M 2024 , anatomy complete & normal  -Imm: RI/VNI, tdap 3/4/2025, counseled on covid vaccine  -Contraception: discuss at future visit  -Birthplan: discuss at future visit  -Care coordination: accepts blood transfusions, latex allergy - rash, call schedule reviewed      Orders:  -     POC Urinalysis Dipstick  -     Tdap Vaccine => 6yo IM (BOOSTRIX/ADACEL)  -     Bile Acids, Total; Future    2. Pruritus  -     Cancel: Comprehensive Metabolic Panel  -     Cancel: Bile Acids, Total  -     Comprehensive Metabolic Panel; Future  -     Bile  Acids, Total; Future  -     Bile Acids, Total  -     Comprehensive Metabolic Panel    3. Screening for diabetes mellitus (DM)  -     Gestational Screen 1 Hr ([facility])    4. Screening for iron deficiency anemia  -     CBC (No Diff); Future  -     CBC (No Diff)    5. 27 weeks gestation of pregnancy  -     Treponema pallidum AB w/Reflex RPR; Future  -     Treponema pallidum AB w/Reflex RPR    6. Hypercholesterolemia    7. Prediabetes    8. Obesity in pregnancy, antepartum    9. Susceptible to varicella (non-immune), currently pregnant    10. Anxiety and depression  Overview:  Previously took hydroxyzine qhs prn. Reports mood stable currently.      11. Carpal tunnel syndrome during pregnancy    12. Carrier of spinal muscular atrophy  Overview:  Partner testing negative      Other orders  -     Cancel: Gestational Diabetes Screen 1 Hour  -     Cancel: CBC (No Diff)  -     Cancel: RPR, Rfx Qn RPR / Confirm TP ([facility])      PTL precautions reviewed. Discussed FKC    Return for F/u prenatal 2 wk.    Rachel Wilcox MD  3/4/2025 09:22 EST

## 2025-03-06 LAB
ALBUMIN SERPL-MCNC: 3.6 G/DL (ref 4–5)
ALP SERPL-CCNC: 82 IU/L (ref 44–121)
ALT SERPL-CCNC: 17 IU/L (ref 0–32)
AST SERPL-CCNC: 19 IU/L (ref 0–40)
BILE AC SERPL-SCNC: <1 UMOL/L (ref 0–10)
BILIRUB SERPL-MCNC: <0.2 MG/DL (ref 0–1.2)
BUN SERPL-MCNC: 3 MG/DL (ref 6–20)
BUN/CREAT SERPL: 5 (ref 9–23)
CALCIUM SERPL-MCNC: 9.5 MG/DL (ref 8.7–10.2)
CHLORIDE SERPL-SCNC: 106 MMOL/L (ref 96–106)
CO2 SERPL-SCNC: 19 MMOL/L (ref 20–29)
CREAT SERPL-MCNC: 0.56 MG/DL (ref 0.57–1)
EGFRCR SERPLBLD CKD-EPI 2021: 131 ML/MIN/1.73
ERYTHROCYTE [DISTWIDTH] IN BLOOD BY AUTOMATED COUNT: 13.8 % (ref 11.7–15.4)
GLOBULIN SER CALC-MCNC: 2.2 G/DL (ref 1.5–4.5)
GLUCOSE 1H P 50 G GLC PO SERPL-MCNC: 98 MG/DL (ref 70–139)
GLUCOSE SERPL-MCNC: 105 MG/DL (ref 70–99)
HCT VFR BLD AUTO: 36.7 % (ref 34–46.6)
HGB BLD-MCNC: 11.7 G/DL (ref 11.1–15.9)
MCH RBC QN AUTO: 29.9 PG (ref 26.6–33)
MCHC RBC AUTO-ENTMCNC: 31.9 G/DL (ref 31.5–35.7)
MCV RBC AUTO: 94 FL (ref 79–97)
PLATELET # BLD AUTO: 229 X10E3/UL (ref 150–450)
POTASSIUM SERPL-SCNC: 4 MMOL/L (ref 3.5–5.2)
PROT SERPL-MCNC: 5.8 G/DL (ref 6–8.5)
RBC # BLD AUTO: 3.91 X10E6/UL (ref 3.77–5.28)
SODIUM SERPL-SCNC: 140 MMOL/L (ref 134–144)
TREPONEMA PALLIDUM IGG+IGM AB [PRESENCE] IN SERUM OR PLASMA BY IMMUNOASSAY: NON REACTIVE
WBC # BLD AUTO: 6.4 X10E3/UL (ref 3.4–10.8)

## 2025-03-21 ENCOUNTER — ROUTINE PRENATAL (OUTPATIENT)
Dept: OBSTETRICS AND GYNECOLOGY | Age: 25
End: 2025-03-21
Payer: COMMERCIAL

## 2025-03-21 VITALS — BODY MASS INDEX: 48.06 KG/M2 | WEIGHT: 293 LBS | DIASTOLIC BLOOD PRESSURE: 82 MMHG | SYSTOLIC BLOOD PRESSURE: 124 MMHG

## 2025-03-21 DIAGNOSIS — E78.00 HYPERCHOLESTEROLEMIA: ICD-10-CM

## 2025-03-21 DIAGNOSIS — O99.210 OBESITY IN PREGNANCY, ANTEPARTUM: ICD-10-CM

## 2025-03-21 DIAGNOSIS — R73.03 PREDIABETES: ICD-10-CM

## 2025-03-21 DIAGNOSIS — Z14.8 CARRIER OF SPINAL MUSCULAR ATROPHY: ICD-10-CM

## 2025-03-21 DIAGNOSIS — F32.A ANXIETY AND DEPRESSION: ICD-10-CM

## 2025-03-21 DIAGNOSIS — G56.00 CARPAL TUNNEL SYNDROME DURING PREGNANCY: ICD-10-CM

## 2025-03-21 DIAGNOSIS — F41.9 ANXIETY AND DEPRESSION: ICD-10-CM

## 2025-03-21 DIAGNOSIS — Z34.03 SUPERVISION OF NORMAL FIRST PREGNANCY IN THIRD TRIMESTER: Primary | ICD-10-CM

## 2025-03-21 DIAGNOSIS — O26.899 CARPAL TUNNEL SYNDROME DURING PREGNANCY: ICD-10-CM

## 2025-03-21 DIAGNOSIS — O09.899 SUSCEPTIBLE TO VARICELLA (NON-IMMUNE), CURRENTLY PREGNANT: ICD-10-CM

## 2025-03-21 DIAGNOSIS — Z28.39 SUSCEPTIBLE TO VARICELLA (NON-IMMUNE), CURRENTLY PREGNANT: ICD-10-CM

## 2025-03-21 PROCEDURE — 0502F SUBSEQUENT PRENATAL CARE: CPT | Performed by: STUDENT IN AN ORGANIZED HEALTH CARE EDUCATION/TRAINING PROGRAM

## 2025-03-21 NOTE — PROGRESS NOTES
[facility]   Obstetrics and Gynecology   Return Obstetric Visit    Jessica Gauthier, a 24 y.o.  at 30w2d, presents for OB follow-up.  She is doing well today.  Denies loss of fluid, vaginal bleeding, and contractions.  Endorses fetal movements.    Starting to have some Jonathan Hernandez contractions.  Continues to have carpal tunnel symptoms which are bothersome but does admit she does not wear her wrist braces regularly as they sometimes keep her from sleeping.    NL 46, XY (male)    Objective  BP Readings from Last 3 Encounters:   25 124/82   25 130/80   25 128/74      Wt Readings from Last 3 Encounters:   25 133 kg (293 lb 6.4 oz)   25 131 kg (289 lb 3.2 oz)   25 131 kg (288 lb)      Total weight gain this pregnancy: 2.903 kg (6 lb 6.4 oz)    General: Awake, alert, no apparent distress  Respiratory: No increased work of breathing  Abdomen: Fundus soft and nontender  Extremity: Nontender, no edema    Last OB ultrasound 3/4/2025  Anatomic survey status:  Normal/Complete  Normal Interval growth  EFW:62%ile, AC:66%ile  Fetal position: Vertex  DIMITRI: 19 cm      Jessica Gauthier is a 24 y.o.  at 30w2d presenting for OB follow-up.    Intrauterine pregnancy at 30w2d   Pregnancy Risk:  HIGH RISK    Assessment & Plan  Supervision of normal first pregnancy in third trimester    Orders:    POC Urinalysis Dipstick    Hypercholesterolemia            Prediabetes  Normal third-trimester 1 hour GTT       Obesity in pregnancy, antepartum  Total weight gain appropriate       Susceptible to varicella (non-immune), currently pregnant         Anxiety and depression           Carpal tunnel syndrome during pregnancy  Discussed regularly use of wrist braces       Carrier of spinal muscular atrophy           Pre-eclampsia symptoms were discussed and warnings were given.   labor was discussed.  Warnings were provided.  Nutrition and weight gain were addressed.  Return for F/u prenatal 2  wk.    Rachel Wilcox MD  3/21/2025 10:19 EDT

## 2025-04-01 ENCOUNTER — ROUTINE PRENATAL (OUTPATIENT)
Dept: OBSTETRICS AND GYNECOLOGY | Age: 25
End: 2025-04-01
Payer: COMMERCIAL

## 2025-04-01 VITALS — WEIGHT: 293 LBS | BODY MASS INDEX: 48.65 KG/M2 | SYSTOLIC BLOOD PRESSURE: 128 MMHG | DIASTOLIC BLOOD PRESSURE: 84 MMHG

## 2025-04-01 DIAGNOSIS — O26.899 CARPAL TUNNEL SYNDROME DURING PREGNANCY: ICD-10-CM

## 2025-04-01 DIAGNOSIS — G56.00 CARPAL TUNNEL SYNDROME DURING PREGNANCY: ICD-10-CM

## 2025-04-01 DIAGNOSIS — O99.210 OBESITY IN PREGNANCY, ANTEPARTUM: ICD-10-CM

## 2025-04-01 DIAGNOSIS — Z28.39 SUSCEPTIBLE TO VARICELLA (NON-IMMUNE), CURRENTLY PREGNANT: ICD-10-CM

## 2025-04-01 DIAGNOSIS — Z14.8 CARRIER OF SPINAL MUSCULAR ATROPHY: ICD-10-CM

## 2025-04-01 DIAGNOSIS — R73.03 PREDIABETES: ICD-10-CM

## 2025-04-01 DIAGNOSIS — Z3A.31 31 WEEKS GESTATION OF PREGNANCY: Primary | ICD-10-CM

## 2025-04-01 DIAGNOSIS — Z34.03 SUPERVISION OF NORMAL FIRST PREGNANCY IN THIRD TRIMESTER: ICD-10-CM

## 2025-04-01 DIAGNOSIS — O09.899 SUSCEPTIBLE TO VARICELLA (NON-IMMUNE), CURRENTLY PREGNANT: ICD-10-CM

## 2025-04-01 LAB
GLUCOSE UR STRIP-MCNC: NEGATIVE MG/DL
PROT UR STRIP-MCNC: NEGATIVE MG/DL

## 2025-04-01 NOTE — PROGRESS NOTES
Chief Complaint   Patient presents with    Routine Prenatal Visit     31 week ob visit with growth scan       HPI: 24 y.o.  at 31w6d gestation  She is here for routine ob visit  Here with her partner, Aldo  She had her growth scan today  EFW was in normal range at 59%  AC 49%  DIMITRI 12 cm  Notes good FM  Is having carpal tunnel issues  Is going to try a wrist splint  Ordered breast pump  Plan f/u as scheduled in 2 wks      Vitals:    25 1003   BP: 128/84   Weight: 135 kg (297 lb)       ROS:  GI:  Negative  : na  Pulmonary: Negative     A/P  1. Intrauterine pregnancy at 31w6d   2. Pregnancy Risk:  NORMAL    Diagnoses and all orders for this visit:    1. 31 weeks gestation of pregnancy (Primary)  -     POC Urinalysis Dipstick, Multipro    2. Carrier of spinal muscular atrophy    3. Carpal tunnel syndrome during pregnancy    4. Obesity in pregnancy, antepartum    5. Susceptible to varicella (non-immune), currently pregnant    6. Prediabetes    7. Supervision of normal first pregnancy in third trimester        -----------------------  PLAN:   Return in about 2 weeks (around 4/15/2025) for f/u as scheduled in 2 wks.      KRISTY Phan  2025 17:21 EDT

## 2025-04-11 DIAGNOSIS — L03.313 CELLULITIS OF CHEST WALL: Primary | ICD-10-CM

## 2025-04-11 RX ORDER — SULFAMETHOXAZOLE AND TRIMETHOPRIM 800; 160 MG/1; MG/1
1 TABLET ORAL 2 TIMES DAILY
Qty: 6 TABLET | Refills: 0 | Status: SHIPPED | OUTPATIENT
Start: 2025-04-11

## 2025-04-25 ENCOUNTER — ROUTINE PRENATAL (OUTPATIENT)
Dept: OBSTETRICS AND GYNECOLOGY | Age: 25
End: 2025-04-25
Payer: COMMERCIAL

## 2025-04-25 VITALS — DIASTOLIC BLOOD PRESSURE: 90 MMHG | WEIGHT: 292 LBS | BODY MASS INDEX: 47.83 KG/M2 | SYSTOLIC BLOOD PRESSURE: 140 MMHG

## 2025-04-25 DIAGNOSIS — Z14.8 CARRIER OF SPINAL MUSCULAR ATROPHY: ICD-10-CM

## 2025-04-25 DIAGNOSIS — O99.210 OBESITY IN PREGNANCY, ANTEPARTUM: ICD-10-CM

## 2025-04-25 DIAGNOSIS — F32.A ANXIETY AND DEPRESSION: ICD-10-CM

## 2025-04-25 DIAGNOSIS — Z34.03 SUPERVISION OF NORMAL FIRST PREGNANCY IN THIRD TRIMESTER: Primary | ICD-10-CM

## 2025-04-25 DIAGNOSIS — O09.899 SUSCEPTIBLE TO VARICELLA (NON-IMMUNE), CURRENTLY PREGNANT: ICD-10-CM

## 2025-04-25 DIAGNOSIS — F41.9 ANXIETY AND DEPRESSION: ICD-10-CM

## 2025-04-25 DIAGNOSIS — O16.3 ELEVATED BLOOD PRESSURE AFFECTING PREGNANCY IN THIRD TRIMESTER, ANTEPARTUM: ICD-10-CM

## 2025-04-25 DIAGNOSIS — N89.8 VAGINAL DISCHARGE: ICD-10-CM

## 2025-04-25 DIAGNOSIS — Z36.85 ANTENATAL SCREENING FOR STREPTOCOCCUS B: ICD-10-CM

## 2025-04-25 DIAGNOSIS — R73.03 PREDIABETES: ICD-10-CM

## 2025-04-25 DIAGNOSIS — Z28.39 SUSCEPTIBLE TO VARICELLA (NON-IMMUNE), CURRENTLY PREGNANT: ICD-10-CM

## 2025-04-25 DIAGNOSIS — N94.89 VAGINAL BURNING: ICD-10-CM

## 2025-04-25 PROCEDURE — 0502F SUBSEQUENT PRENATAL CARE: CPT | Performed by: STUDENT IN AN ORGANIZED HEALTH CARE EDUCATION/TRAINING PROGRAM

## 2025-04-25 PROCEDURE — 3294F GRP B STREP SCREENING DOCD: CPT | Performed by: STUDENT IN AN ORGANIZED HEALTH CARE EDUCATION/TRAINING PROGRAM

## 2025-04-25 NOTE — ASSESSMENT & PLAN NOTE
-Pap: 8/2022 NILM  - Tdap 3/4/2025  -Contraception: discuss at future visit  -Birthplan: hoping spontaneous labor but agreeable to IOL if indicated  -Care coordination: accepts blood transfusions, latex allergy - rash, call schedule reviewed     Orders:    POC Urinalysis Dipstick

## 2025-04-25 NOTE — PROGRESS NOTES
[facility]  Obstetrics and Gynecology   Return Obstetric Visit    Jessica Gauthier, a 24 y.o.  at 35w2d, presents for OB follow-up.  She is doing well today.  Denies loss of fluid, vaginal bleeding, and contractions.  Endorses fetal movements.    Has been having some headaches due to not sleeping well.  They go away with naps.  Denies vision changes, right upper quadrant/epigastric pain.  Patient states with her or overeating she has noticed that her heart rate has been higher sometimes when she is at rest.  She will sometimes have it measured up into the 150s.  She does have the sensation of her heart racing.  Not associated with any chest pain, shortness of breath, difficulty bleeding.  Patient notes she did move this week and it has been stressful.    Right nipple pain and discharge improved with short course of Bactrim.    Patient feels like intermittently she will have increased vaginal discharge associated with burning.  Feels it has been worse the past few days.  Was previously treated for a yeast infection and it briefly improved following this.  Would like swab to evaluate for vaginitis.    Objective  BP Readings from Last 3 Encounters:   25 140/90   25 128/84   25 124/82      Wt Readings from Last 3 Encounters:   25 132 kg (292 lb)   25 135 kg (297 lb)   25 133 kg (293 lb 6.4 oz)      Total weight gain this pregnancy: 2.268 kg (5 lb)    General: Awake, alert, no apparent distress  Respiratory: No increased work of breathing  Breast: right nipple without discharge on gentle pressure, no erythema, swelling  Abdomen: Fundus soft and nontender  Extremity: Nontender, no edema    Last OB US Data (since 10/7/2024)         Value Time User    Anatomic survey  NL/Complete 3/21/2025 10:24 AM O&#39;Rachel Krishna MD    NIPT  NL 46, XY (male) 3/21/2025 10:24 AM O&#39;Rachel Krishna MD    Placenta location  Anterior 2025  1:33 PM O&#39;Rachel Krishna MD    EFW%  59%ile 2025  10:19 AM Cammy Kaye PA    AC%  49%ile 2025 10:19 AM Cammy Kaye PA    BPP  2025  1:33 PM O&#39;Rachel Krishna MD    DIMITRI  15.2 cm 2025  1:33 PM O&#39;Rachel Krishna MD          Jessica Gauthier is a 24 y.o.  at 35w2d presenting for OB follow-up.    Intrauterine pregnancy at 35w2d   Pregnancy Risk:  HIGH RISK       Supervision of normal first pregnancy in third trimester    -Pap: 2022 NILM  - Tdap 3/4/2025  -Contraception: discuss at future visit  -Birthplan: hoping spontaneous labor but agreeable to IOL if indicated  -Care coordination: accepts blood transfusions, latex allergy - rash, call schedule reviewed     Orders:    POC Urinalysis Dipstick     Vaginal burning    Orders:    NuSwab BV & Candida - Swab, Vagina    Vaginal discharge    Orders:    NuSwab BV & Candida - Swab, Vagina    Obesity in pregnancy, antepartum [O99.210]  Total weight gain 5 pounds       Elevated blood pressure affecting pregnancy in third trimester, antepartum  Patient with initial blood pressure 140/90.  Rechecked 15 minutes later was 120s over 80s.  Patient having mild headaches but suspect due to decreased sleep as they resolve with naps.  Preeclampsia precautions were reviewed.  Patient has access to blood pressure cuff and will begin checking her blood pressures at home.  Labs collected today.    Orders:    CBC (No Diff)    Comprehensive Metabolic Panel    Protein / Creatinine Ratio, Urine - Urine, Clean Catch     screening for streptococcus B  Reflex testing ordered due to penicillin allergy  Orders:    Strep Gp B Culture+Rfx ([facility]) - Swab, Vaginal/Rectum    Prediabetes  1 hour GTT within normal limits       Susceptible to varicella (non-immune), currently pregnant  Plan for vaccination postpartum       Anxiety and depression  Mood stable      Carrier of spinal muscular atrophy  Father of baby tested negative         Pre-eclampsia symptoms were discussed and warnings were given.    labor was discussed.  Warnings were provided.  Return for F/u prenatal 1 wk.    Rachel Wilcox MD  4/25/2025 14:27 EDT

## 2025-04-26 LAB
CREAT UR-MCNC: 138.6 MG/DL
PROT UR-MCNC: 16.9 MG/DL
PROT/CREAT UR: 121.9 MG/G CREA (ref 0–200)

## 2025-04-28 LAB
A VAGINAE DNA VAG QL NAA+PROBE: ABNORMAL SCORE
ALBUMIN SERPL-MCNC: 3.8 G/DL (ref 3.5–5.2)
ALBUMIN/GLOB SERPL: 1.7 G/DL
ALP SERPL-CCNC: 100 U/L (ref 39–117)
ALT SERPL-CCNC: 19 U/L (ref 1–33)
AST SERPL-CCNC: 30 U/L (ref 1–32)
BILIRUB SERPL-MCNC: <0.2 MG/DL (ref 0–1.2)
BUN SERPL-MCNC: <2 MG/DL (ref 6–20)
BUN/CREAT SERPL: ABNORMAL
BVAB2 DNA VAG QL NAA+PROBE: ABNORMAL SCORE
C ALBICANS DNA VAG QL NAA+PROBE: POSITIVE
C GLABRATA DNA VAG QL NAA+PROBE: NEGATIVE
CALCIUM SERPL-MCNC: 9.5 MG/DL (ref 8.6–10.5)
CHLORIDE SERPL-SCNC: 108 MMOL/L (ref 98–107)
CO2 SERPL-SCNC: 17.4 MMOL/L (ref 22–29)
CREAT SERPL-MCNC: 0.5 MG/DL (ref 0.57–1)
EGFRCR SERPLBLD CKD-EPI 2021: 135.3 ML/MIN/1.73
ERYTHROCYTE [DISTWIDTH] IN BLOOD BY AUTOMATED COUNT: 13.9 % (ref 12.3–15.4)
GLOBULIN SER CALC-MCNC: 2.3 GM/DL
GLUCOSE SERPL-MCNC: 71 MG/DL (ref 65–99)
HCT VFR BLD AUTO: 37.7 % (ref 34–46.6)
HGB BLD-MCNC: 12.5 G/DL (ref 12–15.9)
MCH RBC QN AUTO: 30.2 PG (ref 26.6–33)
MCHC RBC AUTO-ENTMCNC: 33.2 G/DL (ref 31.5–35.7)
MCV RBC AUTO: 91.1 FL (ref 79–97)
MEGA1 DNA VAG QL NAA+PROBE: ABNORMAL SCORE
PLATELET # BLD AUTO: 210 10*3/MM3 (ref 140–450)
POTASSIUM SERPL-SCNC: 4.4 MMOL/L (ref 3.5–5.2)
PROT SERPL-MCNC: 6.1 G/DL (ref 6–8.5)
RBC # BLD AUTO: 4.14 10*6/MM3 (ref 3.77–5.28)
SODIUM SERPL-SCNC: 137 MMOL/L (ref 136–145)
WBC # BLD AUTO: 5.75 10*3/MM3 (ref 3.4–10.8)

## 2025-04-29 LAB — B-HEM STREP SPEC QL CULT: NEGATIVE

## 2025-05-09 ENCOUNTER — ROUTINE PRENATAL (OUTPATIENT)
Dept: OBSTETRICS AND GYNECOLOGY | Age: 25
End: 2025-05-09
Payer: COMMERCIAL

## 2025-05-09 VITALS — DIASTOLIC BLOOD PRESSURE: 82 MMHG | SYSTOLIC BLOOD PRESSURE: 132 MMHG | WEIGHT: 292.6 LBS | BODY MASS INDEX: 47.93 KG/M2

## 2025-05-09 DIAGNOSIS — F41.9 ANXIETY AND DEPRESSION: ICD-10-CM

## 2025-05-09 DIAGNOSIS — Z34.03 SUPERVISION OF NORMAL FIRST PREGNANCY IN THIRD TRIMESTER: Primary | ICD-10-CM

## 2025-05-09 DIAGNOSIS — F32.A ANXIETY AND DEPRESSION: ICD-10-CM

## 2025-05-09 DIAGNOSIS — Z14.8 CARRIER OF SPINAL MUSCULAR ATROPHY: ICD-10-CM

## 2025-05-09 DIAGNOSIS — O13.3 GESTATIONAL HYPERTENSION, THIRD TRIMESTER: ICD-10-CM

## 2025-05-09 DIAGNOSIS — R73.03 PREDIABETES: ICD-10-CM

## 2025-05-09 DIAGNOSIS — Z28.39 SUSCEPTIBLE TO VARICELLA (NON-IMMUNE), CURRENTLY PREGNANT: ICD-10-CM

## 2025-05-09 DIAGNOSIS — O99.210 OBESITY IN PREGNANCY, ANTEPARTUM: ICD-10-CM

## 2025-05-09 DIAGNOSIS — O09.899 SUSCEPTIBLE TO VARICELLA (NON-IMMUNE), CURRENTLY PREGNANT: ICD-10-CM

## 2025-05-09 NOTE — ASSESSMENT & PLAN NOTE
-Pap: 8/2022 NILM  - Tdap 3/4/2025  -Contraception: typically does NFP, discussed difficulty with breastfeeding, will consider additional method  -Birthplan: IOL request sent for 5/12 PM given diagnosis of GHTN  -Care coordination: accepts blood transfusions, latex allergy - rash, call schedule reviewed     Orders:    POC Urinalysis Dipstick    Labor Induction; Future

## 2025-05-09 NOTE — PROGRESS NOTES
[facility]  Obstetrics and Gynecology   Return Obstetric Visit    Jessica Gauthier, a 24 y.o.  at 37w2d, presents for OB follow-up.  She is doing well today.  Denies loss of fluid, vaginal bleeding, and contractions.  Endorses fetal movements.    Has been checking her BP at home - highest 148/95. Denies headache, vision changes, RUQ/epigastric pain. Discussed need for IOL given GHTN - would like to schedule when this provider is on call .    Objective  BP Readings from Last 3 Encounters:   25 132/82   25 140/90   25 128/84      Wt Readings from Last 3 Encounters:   25 133 kg (292 lb 9.6 oz)   25 132 kg (292 lb)   25 135 kg (297 lb)      Total weight gain this pregnancy: 2.54 kg (5 lb 9.6 oz)    General: Awake, alert, no apparent distress  Respiratory: No increased work of breathing  Abdomen: Fundus soft and nontender  Extremity: Nontender, no edema    SVE: //hi    Last OB US Data (since 10/21/2024)         Value Time User    Anatomic survey  NL/Complete 3/21/2025 10:24 AM O&#39;Rachel Krishna MD    NIPT  NL 46, XY (male) 3/21/2025 10:24 AM O&#39;Rachel Krishna MD    Placenta location  Anterior 2025 11:55 AM O&#39;Rachel Krishna MD    EFW%  48%ile 2025 11:55 AM O&#39;Rachel Krishna MD    AC%  52%ile 2025 11:55 AM O&#39;Rachel Krishna MD    BPP  2025 11:55 AM O&#39;Rachel Krishna MD    DIMITRI  14.4 cm 2025 11:55 AM O&#39;Rachel Krishna MD            Jessica Gauthier is a 24 y.o.  at 37w2d presenting for OB follow-up.    Intrauterine pregnancy at 37w2d   Pregnancy Risk:  HIGH RISK       Supervision of normal first pregnancy in third trimester    -Pap: 2022 NILM  - Tdap 3/4/2025  -Contraception: typically does NFP, discussed difficulty with breastfeeding, will consider additional method  -Birthplan: IOL request sent for 5/12 PM given diagnosis of GHTN  -Care coordination: accepts blood transfusions, latex allergy - rash, call schedule reviewed     Orders:    POC  Urinalysis Dipstick    Labor Induction; Future     Obesity in pregnancy, antepartum [O99.210]  TWG 5lb  Orders:    Labor Induction; Future    Gestational hypertension, third trimester  Strict return precautions given. IOL scheduled     Orders:    Labor Induction; Future    Prediabetes  1 hour GTT within normal limits          Susceptible to varicella (non-immune), currently pregnant  Plan for vaccination postpartum        Anxiety and depression  Mood stable. Excited for baby to come        Carrier of spinal muscular atrophy  Father of baby tested negative          SVE ft/th/hi - plan for cervidil followed by pitocin for IOL    Pre-eclampsia symptoms were discussed and warnings were given.  Routine labor warnings were discussed and indications for L & D f/u including bleeding, regular contractions, decreased fetal movement or/and rupture of membranes.   Return in about 2 weeks (around 5/23/2025) for PP BP check.    Rachel Wilcox MD  5/9/2025 12:47 EDT

## 2025-05-12 ENCOUNTER — ANESTHESIA (OUTPATIENT)
Dept: LABOR AND DELIVERY | Facility: HOSPITAL | Age: 25
End: 2025-05-12
Payer: COMMERCIAL

## 2025-05-12 ENCOUNTER — HOSPITAL ENCOUNTER (INPATIENT)
Facility: HOSPITAL | Age: 25
LOS: 5 days | Discharge: HOME | End: 2025-05-17
Attending: STUDENT IN AN ORGANIZED HEALTH CARE EDUCATION/TRAINING PROGRAM | Admitting: STUDENT IN AN ORGANIZED HEALTH CARE EDUCATION/TRAINING PROGRAM
Payer: COMMERCIAL

## 2025-05-12 ENCOUNTER — ANESTHESIA EVENT (OUTPATIENT)
Dept: LABOR AND DELIVERY | Facility: HOSPITAL | Age: 25
End: 2025-05-12
Payer: COMMERCIAL

## 2025-05-12 ENCOUNTER — HOSPITAL ENCOUNTER (OUTPATIENT)
Dept: LABOR AND DELIVERY | Facility: HOSPITAL | Age: 25
Discharge: HOME | End: 2025-05-12
Payer: COMMERCIAL

## 2025-05-12 PROBLEM — Z34.90 PREGNANCY: Status: ACTIVE | Noted: 2025-05-12

## 2025-05-12 LAB
ABO GROUP BLD: NORMAL
ALBUMIN SERPL-MCNC: 3.6 G/DL (ref 3.5–5.2)
ALBUMIN/GLOB SERPL: 1.2 G/DL
ALP SERPL-CCNC: 117 U/L (ref 39–117)
ALT SERPL W P-5'-P-CCNC: 15 U/L (ref 1–33)
ANION GAP SERPL CALCULATED.3IONS-SCNC: 10.1 MMOL/L (ref 5–15)
AST SERPL-CCNC: 27 U/L (ref 1–32)
BASOPHILS # BLD AUTO: 0.01 10*3/MM3 (ref 0–0.2)
BASOPHILS NFR BLD AUTO: 0.2 % (ref 0–1.5)
BILIRUB SERPL-MCNC: <0.2 MG/DL (ref 0–1.2)
BLD GP AB SCN SERPL QL: NEGATIVE
BUN SERPL-MCNC: 6 MG/DL (ref 6–20)
BUN/CREAT SERPL: 9 (ref 7–25)
CALCIUM SPEC-SCNC: 9.8 MG/DL (ref 8.6–10.5)
CHLORIDE SERPL-SCNC: 106 MMOL/L (ref 98–107)
CO2 SERPL-SCNC: 18.9 MMOL/L (ref 22–29)
CREAT SERPL-MCNC: 0.67 MG/DL (ref 0.57–1)
DEPRECATED RDW RBC AUTO: 44.9 FL (ref 37–54)
EGFRCR SERPLBLD CKD-EPI 2021: 126.1 ML/MIN/1.73
EOSINOPHIL # BLD AUTO: 0.06 10*3/MM3 (ref 0–0.4)
EOSINOPHIL NFR BLD AUTO: 1.1 % (ref 0.3–6.2)
ERYTHROCYTE [DISTWIDTH] IN BLOOD BY AUTOMATED COUNT: 13.9 % (ref 12.3–15.4)
GLOBULIN UR ELPH-MCNC: 3.1 GM/DL
GLUCOSE SERPL-MCNC: 83 MG/DL (ref 65–99)
HCT VFR BLD AUTO: 34.9 % (ref 34–46.6)
HGB BLD-MCNC: 11.9 G/DL (ref 12–15.9)
IMM GRANULOCYTES # BLD AUTO: 0.05 10*3/MM3 (ref 0–0.05)
IMM GRANULOCYTES NFR BLD AUTO: 0.9 % (ref 0–0.5)
LYMPHOCYTES # BLD AUTO: 1.12 10*3/MM3 (ref 0.7–3.1)
LYMPHOCYTES NFR BLD AUTO: 20.9 % (ref 19.6–45.3)
MCH RBC QN AUTO: 30.6 PG (ref 26.6–33)
MCHC RBC AUTO-ENTMCNC: 34.1 G/DL (ref 31.5–35.7)
MCV RBC AUTO: 89.7 FL (ref 79–97)
MONOCYTES # BLD AUTO: 0.47 10*3/MM3 (ref 0.1–0.9)
MONOCYTES NFR BLD AUTO: 8.8 % (ref 5–12)
NEUTROPHILS NFR BLD AUTO: 3.65 10*3/MM3 (ref 1.7–7)
NEUTROPHILS NFR BLD AUTO: 68.1 % (ref 42.7–76)
NRBC BLD AUTO-RTO: 0 /100 WBC (ref 0–0.2)
PLATELET # BLD AUTO: 183 10*3/MM3 (ref 140–450)
PMV BLD AUTO: 11.5 FL (ref 6–12)
POTASSIUM SERPL-SCNC: 3.8 MMOL/L (ref 3.5–5.2)
PROT SERPL-MCNC: 6.7 G/DL (ref 6–8.5)
RBC # BLD AUTO: 3.89 10*6/MM3 (ref 3.77–5.28)
RH BLD: POSITIVE
SODIUM SERPL-SCNC: 135 MMOL/L (ref 136–145)
T&S EXPIRATION DATE: NORMAL
TREPONEMA PALLIDUM IGG+IGM AB [PRESENCE] IN SERUM OR PLASMA BY IMMUNOASSAY: NORMAL
WBC NRBC COR # BLD AUTO: 5.36 10*3/MM3 (ref 3.4–10.8)

## 2025-05-12 PROCEDURE — 86780 TREPONEMA PALLIDUM: CPT | Performed by: STUDENT IN AN ORGANIZED HEALTH CARE EDUCATION/TRAINING PROGRAM

## 2025-05-12 PROCEDURE — 86900 BLOOD TYPING SEROLOGIC ABO: CPT | Performed by: STUDENT IN AN ORGANIZED HEALTH CARE EDUCATION/TRAINING PROGRAM

## 2025-05-12 PROCEDURE — 80053 COMPREHEN METABOLIC PANEL: CPT | Performed by: STUDENT IN AN ORGANIZED HEALTH CARE EDUCATION/TRAINING PROGRAM

## 2025-05-12 PROCEDURE — 85025 COMPLETE CBC W/AUTO DIFF WBC: CPT | Performed by: STUDENT IN AN ORGANIZED HEALTH CARE EDUCATION/TRAINING PROGRAM

## 2025-05-12 PROCEDURE — 86850 RBC ANTIBODY SCREEN: CPT | Performed by: STUDENT IN AN ORGANIZED HEALTH CARE EDUCATION/TRAINING PROGRAM

## 2025-05-12 PROCEDURE — 86901 BLOOD TYPING SEROLOGIC RH(D): CPT | Performed by: STUDENT IN AN ORGANIZED HEALTH CARE EDUCATION/TRAINING PROGRAM

## 2025-05-12 RX ORDER — FAMOTIDINE 20 MG/1
20 TABLET, FILM COATED ORAL 2 TIMES DAILY PRN
Status: DISCONTINUED | OUTPATIENT
Start: 2025-05-12 | End: 2025-05-14

## 2025-05-12 RX ORDER — SODIUM CHLORIDE, SODIUM LACTATE, POTASSIUM CHLORIDE, CALCIUM CHLORIDE 600; 310; 30; 20 MG/100ML; MG/100ML; MG/100ML; MG/100ML
125 INJECTION, SOLUTION INTRAVENOUS CONTINUOUS
Status: DISCONTINUED | OUTPATIENT
Start: 2025-05-12 | End: 2025-05-14

## 2025-05-12 RX ORDER — SODIUM CHLORIDE 0.9 % (FLUSH) 0.9 %
10 SYRINGE (ML) INJECTION AS NEEDED
Status: DISCONTINUED | OUTPATIENT
Start: 2025-05-12 | End: 2025-05-14

## 2025-05-12 RX ORDER — FENTANYL/ROPIVACAINE/NS/PF 2MCG/ML-.2
10 PLASTIC BAG, INJECTION (ML) INJECTION CONTINUOUS
Refills: 0 | Status: DISCONTINUED | OUTPATIENT
Start: 2025-05-12 | End: 2025-05-14

## 2025-05-12 RX ORDER — MAGNESIUM CARB/ALUMINUM HYDROX 105-160MG
30 TABLET,CHEWABLE ORAL ONCE AS NEEDED
Status: DISCONTINUED | OUTPATIENT
Start: 2025-05-12 | End: 2025-05-14

## 2025-05-12 RX ORDER — DIPHENHYDRAMINE HYDROCHLORIDE 50 MG/ML
12.5 INJECTION, SOLUTION INTRAMUSCULAR; INTRAVENOUS EVERY 8 HOURS PRN
Status: DISCONTINUED | OUTPATIENT
Start: 2025-05-12 | End: 2025-05-14 | Stop reason: HOSPADM

## 2025-05-12 RX ORDER — FAMOTIDINE 10 MG/ML
20 INJECTION, SOLUTION INTRAVENOUS ONCE AS NEEDED
Status: COMPLETED | OUTPATIENT
Start: 2025-05-12 | End: 2025-05-14

## 2025-05-12 RX ORDER — BUTORPHANOL TARTRATE 2 MG/ML
2 INJECTION, SOLUTION INTRAMUSCULAR; INTRAVENOUS
Status: DISCONTINUED | OUTPATIENT
Start: 2025-05-12 | End: 2025-05-14

## 2025-05-12 RX ORDER — ACETAMINOPHEN 325 MG/1
650 TABLET ORAL EVERY 4 HOURS PRN
Status: DISCONTINUED | OUTPATIENT
Start: 2025-05-12 | End: 2025-05-14

## 2025-05-12 RX ORDER — ONDANSETRON 2 MG/ML
4 INJECTION INTRAMUSCULAR; INTRAVENOUS EVERY 6 HOURS PRN
Status: DISCONTINUED | OUTPATIENT
Start: 2025-05-12 | End: 2025-05-14

## 2025-05-12 RX ORDER — EPHEDRINE SULFATE 50 MG/ML
5 INJECTION, SOLUTION INTRAVENOUS
Status: DISCONTINUED | OUTPATIENT
Start: 2025-05-12 | End: 2025-05-14 | Stop reason: HOSPADM

## 2025-05-12 RX ORDER — ONDANSETRON 4 MG/1
4 TABLET, ORALLY DISINTEGRATING ORAL EVERY 6 HOURS PRN
Status: DISCONTINUED | OUTPATIENT
Start: 2025-05-12 | End: 2025-05-14

## 2025-05-12 RX ORDER — ONDANSETRON 2 MG/ML
4 INJECTION INTRAMUSCULAR; INTRAVENOUS ONCE AS NEEDED
Status: DISCONTINUED | OUTPATIENT
Start: 2025-05-12 | End: 2025-05-14

## 2025-05-12 RX ORDER — TERBUTALINE SULFATE 1 MG/ML
0.25 INJECTION SUBCUTANEOUS AS NEEDED
Status: DISCONTINUED | OUTPATIENT
Start: 2025-05-12 | End: 2025-05-14

## 2025-05-12 RX ORDER — FAMOTIDINE 10 MG/ML
20 INJECTION, SOLUTION INTRAVENOUS 2 TIMES DAILY PRN
Status: DISCONTINUED | OUTPATIENT
Start: 2025-05-12 | End: 2025-05-14

## 2025-05-12 RX ADMIN — DINOPROSTONE 10 MG: 10 INSERT VAGINAL at 19:35

## 2025-05-13 PROBLEM — O13.3 GESTATIONAL HYPERTENSION, THIRD TRIMESTER: Status: ACTIVE | Noted: 2025-05-13

## 2025-05-13 LAB
NITRAZINE EXPIRATION DATE: NORMAL
NITRAZINE LOT NUMBER: NORMAL
PH FLD: 7.5 [PH]

## 2025-05-13 PROCEDURE — C1755 CATHETER, INTRASPINAL: HCPCS

## 2025-05-13 PROCEDURE — S0260 H&P FOR SURGERY: HCPCS | Performed by: STUDENT IN AN ORGANIZED HEALTH CARE EDUCATION/TRAINING PROGRAM

## 2025-05-13 PROCEDURE — 25810000003 LACTATED RINGERS PER 1000 ML: Performed by: STUDENT IN AN ORGANIZED HEALTH CARE EDUCATION/TRAINING PROGRAM

## 2025-05-13 RX ORDER — OXYTOCIN/0.9 % SODIUM CHLORIDE 30/500 ML
2-30 PLASTIC BAG, INJECTION (ML) INTRAVENOUS
Status: DISCONTINUED | OUTPATIENT
Start: 2025-05-13 | End: 2025-05-14

## 2025-05-13 RX ADMIN — Medication 2 MILLI-UNITS/MIN: at 07:02

## 2025-05-13 RX ADMIN — SODIUM CHLORIDE, POTASSIUM CHLORIDE, SODIUM LACTATE AND CALCIUM CHLORIDE 125 ML/HR: 600; 310; 30; 20 INJECTION, SOLUTION INTRAVENOUS at 20:45

## 2025-05-13 RX ADMIN — SODIUM CHLORIDE, POTASSIUM CHLORIDE, SODIUM LACTATE AND CALCIUM CHLORIDE 999 ML/HR: 600; 310; 30; 20 INJECTION, SOLUTION INTRAVENOUS at 23:46

## 2025-05-13 RX ADMIN — Medication 10 MILLI-UNITS/MIN: at 20:45

## 2025-05-13 NOTE — PROGRESS NOTES
[facility]  Obstetrics and Gynecology       Name:  Jessica Gauthier        MR#:  [id number]      Labor Progress Note  Patient feeling contractions every few minutes. Discouraged by progress - reassured regarding induction, early term GA, ability to continue increasing pitocin. Agreeable to IUPC placement           Vitals:  Temperature: Temp:  [97.5 °F (36.4 °C)-98.6 °F (37 °C)] 98.4 °F (36.9 °C)  Temp Source: Temp src: Oral  BP:  BP: (126-161)/(64-95) 134/93  Pulse:  Heart Rate:  [] 101  Respirations: Resp:  [16-18] 16     Fetal Heart Rate Assessment:   Category 1, baseline 125, moderate variability, positive accelerations, no decelerations  [place]:  Q2-6min, irregularly    Physical Exam:   General: Patient is in no acute distress, breathing through contractions  Pelvic Exam: was checked (by me): 2 cm / 70 % / -2    A/P  IUPC placed for ability to further titrate pitocin - will increase max to 30 mU/min. FHT Category 1. We discussed definition for failed IOL but if reassuring maternal / fetal status, can continue induction beyond this. Coping well without epidural at this time.    Has had one non-sustained severe range BP. Continue to closely monitor.    Rachel Wilcox MD  5/13/2025 16:13 EDT

## 2025-05-13 NOTE — PLAN OF CARE
Goal Outcome Evaluation:  Plan of Care Reviewed With: patient        Progress: improving  Outcome Evaluation: IOl for GHTN. Started on pit this AM. Anticipate

## 2025-05-13 NOTE — H&P
[facility]  Obstetric History and Physical     Chief Complaint: scheduled IOL     Subjective     Patient is a 24 y.o. female  currently at 37w6d who presents for scheduled IOL for GHTN. Feeling well this morning. Excited about water breaking    Her prenatal care is otherwise notable for obesity (BMI 48), varicella non-immunity, SMA carrier (father of baby not a carrier), prediabetes (normal 1hr GTT x2), hypercholesterolemia, anxiety.  Her previous obstetric/gynecological history is noted for is non-contributory.    The below elements of history were reviewed upon admission:     Past OB History:       OB History    Para Term  AB Living   1 0 0 0 0 0   SAB IAB Ectopic Molar Multiple Live Births   0 0 0 0 0 0               Past Medical History: Past Medical History:   Diagnosis Date    Anxiety     regulating / getting better    Depression     Not currently. not diagnosed    Endometriosis     diagnosed aug 2023. laparoscopic surgery    Fibroid     found in aug 2023 in laparoscopy    Hyperlipidemia Always seen this in my results from a young age    Migraine     chronic. not medicated    PMS (premenstrual syndrome)         Past Surgical History Past Surgical History:   Procedure Laterality Date    FULGURATION ENDOMETRIOSIS  aug 23    laparoscopy    HYSTEROSCOPY      WISDOM TOOTH EXTRACTION          Family History: Family History   Problem Relation Age of Onset    Kidney disease Father     Pancreatic cancer Maternal Grandmother     Diabetes Maternal Grandmother     Breast cancer Maternal Grandmother 50    Prostate cancer Maternal Grandfather         just surgery    Diabetes Maternal Grandfather     Breast cancer Maternal Aunt 40 - 49    Uterine cancer Neg Hx     Ovarian cancer Neg Hx     Colon cancer Neg Hx       Social History:  reports that she has never smoked. She does not have any smokeless tobacco history on file.   reports that she does not currently use alcohol.   reports no history of  drug use.        General ROS: Pertinent items are noted in HPI    Objective      Vitals:     Vitals:    05/12/25 2236 05/13/25 0044 05/13/25 0524 05/13/25 0700   BP: 126/72 126/64 142/87 152/84   BP Location: Right arm Left arm  Left arm   Patient Position: Lying Lying  Sitting   Pulse: 90 86 92 94   Resp: 16 16  16   Temp: 98.3 °F (36.8 °C) 98.3 °F (36.8 °C) 98.4 °F (36.9 °C) 98.4 °F (36.9 °C)   TempSrc: Oral Oral  Oral   SpO2:  98%     Weight:           Fetal Heart Rate Assessment:   Category 1, 120 baseline, moderate variability, positive 15x15 accelerations, no decelerations    Lastrup:   irritable     Physical Exam:     General Appearance:    Alert, cooperative, in no acute distress   Abdomen:     Soft, non-tender, no guarding, no rebound tenderness,       EFW 3200g   Pelvic Exam:    Pelvis adequate.    Presentation: Vertex    Cervix: was checked (by RN): 1 cm / 50% % / -2   Extremities:   Moves all extremities well, no edema, no cyanosis, no              redness   Skin:   No bleeding, bruising or rash   Neurologic:   No focal neurologic defect          Laboratory Results:   Lab Results (last 48 hours)       Procedure Component Value Units Date/Time    POC PH Fluid [[id number]] Collected: 05/13/25 0646    Specimen: Body Fluid Updated: 05/13/25 0646     pH, Fluid 7.50     Lot Number [id number]     [facility]    Treponema pallidum AB w/Reflex RPR [[id number]]  (Normal) Collected: 05/12/25 1805    Specimen: Blood from Arm, Left Updated: 05/12/25 1851     Treponemal AB Total Non-Reactive    Narrative:      Reactive results will reflex RPR testing.    Comprehensive Metabolic Panel [[id number]]  (Abnormal) Collected: 05/12/25 1805    Specimen: Blood from Arm, Left Updated: 05/12/25 1844     Glucose 83 mg/dL      BUN 6 mg/dL      Creatinine 0.67 mg/dL      Sodium 135 mmol/L      Potassium 3.8 mmol/L      Chloride 106 mmol/L      CO2 18.9 mmol/L      Calcium 9.8 mg/dL      Total Protein 6.7 g/dL       Albumin 3.6 g/dL      ALT (SGPT) 15 U/L      AST (SGOT) 27 U/L      Alkaline Phosphatase 117 U/L      Total Bilirubin <0.2 mg/dL      Globulin 3.1 gm/dL      A/G Ratio 1.2 g/dL      BUN/Creatinine Ratio 9.0     Anion Gap 10.1 mmol/L      eGFR 126.1 mL/min/1.73     Narrative:      GFR Categories in Chronic Kidney Disease (CKD)              GFR Category          GFR (mL/min/1.73)    Interpretation  G1                    90 or greater        Normal or high (1)  G2                    60-89                Mild decrease (1)  G3a                   45-59                Mild to moderate decrease  G3b                   30-44                Moderate to severe decrease  G4                    15-29                Severe decrease  G5                    14 or less           Kidney failure    (1)In the absence of evidence of kidney disease, neither GFR category G1 or G2 fulfill the criteria for CKD.    eGFR calculation 2021 CKD-EPI creatinine equation, which does not include race as a factor    CBC & Differential [[id number]]  (Abnormal) Collected: 05/12/25 1805    Specimen: Blood from Arm, Left Updated: 05/12/25 1825    Narrative:      The following orders were created for panel order CBC & Differential.  Procedure                               Abnormality         Status                     ---------                               -----------         ------                     CBC Auto Differential[[id number]]        Abnormal            Final result                 Please view results for these tests on the individual orders.    CBC Auto Differential [[id number]]  (Abnormal) Collected: 05/12/25 1805    Specimen: Blood from Arm, Left Updated: 05/12/25 1825     WBC 5.36 10*3/mm3      RBC 3.89 10*6/mm3      Hemoglobin 11.9 g/dL      Hematocrit 34.9 %      MCV 89.7 fL      MCH 30.6 pg      MCHC 34.1 g/dL      RDW 13.9 %      RDW-SD 44.9 fl      MPV 11.5 fL      Platelets 183 10*3/mm3      Neutrophil % 68.1 %      Lymphocyte % 20.9 %       Monocyte % 8.8 %      Eosinophil % 1.1 %      Basophil % 0.2 %      Immature Grans % 0.9 %      Neutrophils, Absolute 3.65 10*3/mm3      Lymphocytes, Absolute 1.12 10*3/mm3      Monocytes, Absolute 0.47 10*3/mm3      Eosinophils, Absolute 0.06 10*3/mm3      Basophils, Absolute 0.01 10*3/mm3      Immature Grans, Absolute 0.05 10*3/mm3      nRBC 0.0 /100 WBC                Assessment & Plan     Principal Problem:    Gestational hypertension, third trimester  Active Problems:    Anxiety and depression    Supervision of normal first pregnancy in third trimester    Hypercholesterolemia    Obesity in pregnancy, antepartum    Prediabetes    Susceptible to varicella (non-immune), currently pregnant    Carrier of spinal muscular atrophy    Pregnancy    Assessment:  1.  Intrauterine pregnancy at 37w6d gestation.    2.  Induction of labor due to GHTN  3.  GBS status:Negative    Plan:  1. S/p cervical ripening with cervidil. SROM with clear fluid occurred at 0630. Now on pitocin  2. GHTN - BP intermittently mild range, will continue to closely monitor. Labs on admission reviewed and unremarkable    Plan of care has been reviewed with patient. Risks, benefits of treatment plan have been discussed. All questions have been answered.     Rachel Wilcox MD   5/13/2025   08:02 EDT

## 2025-05-14 LAB
ATMOSPHERIC PRESS: 743.8 MMHG
ATMOSPHERIC PRESS: 744.5 MMHG
BASE EXCESS BLDCOA CALC-SCNC: -5.5 MMOL/L (ref -2–2)
BASE EXCESS BLDCOV CALC-SCNC: -5 MMOL/L (ref -30–30)
BDY SITE: ABNORMAL
BDY SITE: NORMAL
DEVICE COMMENT: NORMAL
GAS FLOW AIRWAY: 3 LPM
GAS FLOW AIRWAY: 3 LPM
HCO3 BLDCOA-SCNC: 20.8 MMOL/L (ref 22–28)
HCO3 BLDCOV-SCNC: 20.3 MMOL/L
INHALED O2 CONCENTRATION: 32 %
INHALED O2 CONCENTRATION: 32 %
MODALITY: ABNORMAL
MODALITY: NORMAL
PCO2 BLDCOA: 42.6 MMHG (ref 43–63)
PCO2 BLDCOV: 37.8 MM HG (ref 35–51.3)
PH BLDCOA: 7.3 PH UNITS (ref 7.18–7.34)
PH BLDCOV: 7.34 PH UNITS (ref 7.26–7.4)
PO2 BLDCOA: 17.8 MMHG (ref 12–26)
PO2 BLDCOV: 20.3 MM HG (ref 19–39)
SAO2 % BLDCOA: 22 %
SAO2 % BLDCOV: NORMAL %

## 2025-05-14 PROCEDURE — 25010000002 LIDOCAINE-EPINEPHRINE (PF) 1 %-1:200000 SOLUTION: Performed by: ANESTHESIOLOGY

## 2025-05-14 PROCEDURE — 25010000002 BUPIVACAINE PF 0.75 % SOLUTION: Performed by: ANESTHESIOLOGY

## 2025-05-14 PROCEDURE — 25810000003 LACTATED RINGERS PER 1000 ML: Performed by: ANESTHESIOLOGY

## 2025-05-14 PROCEDURE — 25010000002 FAMOTIDINE 10 MG/ML SOLUTION: Performed by: STUDENT IN AN ORGANIZED HEALTH CARE EDUCATION/TRAINING PROGRAM

## 2025-05-14 PROCEDURE — 25010000002 CLINDAMYCIN 900 MG/50ML SOLUTION: Performed by: STUDENT IN AN ORGANIZED HEALTH CARE EDUCATION/TRAINING PROGRAM

## 2025-05-14 PROCEDURE — 25810000003 LACTATED RINGERS PER 1000 ML: Performed by: STUDENT IN AN ORGANIZED HEALTH CARE EDUCATION/TRAINING PROGRAM

## 2025-05-14 PROCEDURE — 25010000002 GENTAMICIN PER 80 MG: Performed by: STUDENT IN AN ORGANIZED HEALTH CARE EDUCATION/TRAINING PROGRAM

## 2025-05-14 PROCEDURE — 25010000002 MORPHINE PER 10 MG: Performed by: ANESTHESIOLOGY

## 2025-05-14 PROCEDURE — 25010000002 AZITHROMYCIN PER 500 MG: Performed by: STUDENT IN AN ORGANIZED HEALTH CARE EDUCATION/TRAINING PROGRAM

## 2025-05-14 PROCEDURE — 25810000003 SODIUM CHLORIDE 0.9 % SOLUTION 250 ML FLEX CONT: Performed by: STUDENT IN AN ORGANIZED HEALTH CARE EDUCATION/TRAINING PROGRAM

## 2025-05-14 PROCEDURE — 25010000002 PHENYLEPHRINE 10 MG/ML SOLUTION: Performed by: ANESTHESIOLOGY

## 2025-05-14 PROCEDURE — 25010000002 FENTANYL CITRATE (PF) 50 MCG/ML SOLUTION: Performed by: ANESTHESIOLOGY

## 2025-05-14 PROCEDURE — 59510 CESAREAN DELIVERY: CPT | Performed by: STUDENT IN AN ORGANIZED HEALTH CARE EDUCATION/TRAINING PROGRAM

## 2025-05-14 PROCEDURE — C1755 CATHETER, INTRASPINAL: HCPCS | Performed by: ANESTHESIOLOGY

## 2025-05-14 PROCEDURE — 25010000002 KETOROLAC TROMETHAMINE PER 15 MG: Performed by: STUDENT IN AN ORGANIZED HEALTH CARE EDUCATION/TRAINING PROGRAM

## 2025-05-14 PROCEDURE — 88307 TISSUE EXAM BY PATHOLOGIST: CPT

## 2025-05-14 PROCEDURE — 63710000001 DIPHENHYDRAMINE PER 50 MG: Performed by: ANESTHESIOLOGY

## 2025-05-14 PROCEDURE — 82803 BLOOD GASES ANY COMBINATION: CPT | Performed by: STUDENT IN AN ORGANIZED HEALTH CARE EDUCATION/TRAINING PROGRAM

## 2025-05-14 PROCEDURE — 25010000002 ONDANSETRON PER 1 MG: Performed by: STUDENT IN AN ORGANIZED HEALTH CARE EDUCATION/TRAINING PROGRAM

## 2025-05-14 RX ORDER — ONDANSETRON 4 MG/1
4 TABLET, ORALLY DISINTEGRATING ORAL EVERY 4 HOURS PRN
Status: DISCONTINUED | OUTPATIENT
Start: 2025-05-14 | End: 2025-05-17 | Stop reason: HOSPADM

## 2025-05-14 RX ORDER — OXYTOCIN/0.9 % SODIUM CHLORIDE 30/500 ML
125 PLASTIC BAG, INJECTION (ML) INTRAVENOUS ONCE AS NEEDED
Status: COMPLETED | OUTPATIENT
Start: 2025-05-14 | End: 2025-05-14

## 2025-05-14 RX ORDER — ACETAMINOPHEN 500 MG
1000 TABLET ORAL EVERY 6 HOURS
Status: DISCONTINUED | OUTPATIENT
Start: 2025-05-15 | End: 2025-05-15

## 2025-05-14 RX ORDER — ACETAMINOPHEN 500 MG
1000 TABLET ORAL EVERY 6 HOURS PRN
Status: DISCONTINUED | OUTPATIENT
Start: 2025-05-14 | End: 2025-05-14 | Stop reason: HOSPADM

## 2025-05-14 RX ORDER — ACETAMINOPHEN 500 MG
1000 TABLET ORAL ONCE
Status: COMPLETED | OUTPATIENT
Start: 2025-05-14 | End: 2025-05-14

## 2025-05-14 RX ORDER — KETOROLAC TROMETHAMINE 15 MG/ML
15 INJECTION, SOLUTION INTRAMUSCULAR; INTRAVENOUS EVERY 6 HOURS
Status: DISPENSED | OUTPATIENT
Start: 2025-05-14 | End: 2025-05-15

## 2025-05-14 RX ORDER — BUPIVACAINE HYDROCHLORIDE 7.5 MG/ML
INJECTION, SOLUTION EPIDURAL; RETROBULBAR
Status: COMPLETED | OUTPATIENT
Start: 2025-05-14 | End: 2025-05-14

## 2025-05-14 RX ORDER — AMOXICILLIN 250 MG
1 CAPSULE ORAL 2 TIMES DAILY
Status: DISCONTINUED | OUTPATIENT
Start: 2025-05-14 | End: 2025-05-17 | Stop reason: HOSPADM

## 2025-05-14 RX ORDER — HYDROMORPHONE HYDROCHLORIDE 1 MG/ML
0.5 INJECTION, SOLUTION INTRAMUSCULAR; INTRAVENOUS; SUBCUTANEOUS
Status: DISCONTINUED | OUTPATIENT
Start: 2025-05-14 | End: 2025-05-14 | Stop reason: HOSPADM

## 2025-05-14 RX ORDER — OXYTOCIN/0.9 % SODIUM CHLORIDE 30/500 ML
999 PLASTIC BAG, INJECTION (ML) INTRAVENOUS ONCE
Status: DISCONTINUED | OUTPATIENT
Start: 2025-05-14 | End: 2025-05-14 | Stop reason: HOSPADM

## 2025-05-14 RX ORDER — ONDANSETRON 4 MG/1
4 TABLET, ORALLY DISINTEGRATING ORAL EVERY 6 HOURS PRN
Status: DISCONTINUED | OUTPATIENT
Start: 2025-05-14 | End: 2025-05-14 | Stop reason: HOSPADM

## 2025-05-14 RX ORDER — DIPHENHYDRAMINE HYDROCHLORIDE 50 MG/ML
25 INJECTION, SOLUTION INTRAMUSCULAR; INTRAVENOUS EVERY 4 HOURS PRN
Status: DISCONTINUED | OUTPATIENT
Start: 2025-05-14 | End: 2025-05-17 | Stop reason: HOSPADM

## 2025-05-14 RX ORDER — HYDROCORTISONE 25 MG/G
CREAM TOPICAL 3 TIMES DAILY PRN
Status: DISCONTINUED | OUTPATIENT
Start: 2025-05-14 | End: 2025-05-17 | Stop reason: HOSPADM

## 2025-05-14 RX ORDER — PHENYLEPHRINE HYDROCHLORIDE 10 MG/ML
INJECTION INTRAVENOUS AS NEEDED
Status: DISCONTINUED | OUTPATIENT
Start: 2025-05-14 | End: 2025-05-14 | Stop reason: SURG

## 2025-05-14 RX ORDER — PHENYLEPHRINE HYDROCHLORIDE 10 MG/ML
INJECTION INTRAVENOUS AS NEEDED
Status: DISCONTINUED | OUTPATIENT
Start: 2025-05-14 | End: 2025-05-14

## 2025-05-14 RX ORDER — MORPHINE SULFATE 2 MG/ML
2 INJECTION, SOLUTION INTRAMUSCULAR; INTRAVENOUS
Status: ACTIVE | OUTPATIENT
Start: 2025-05-14 | End: 2025-05-15

## 2025-05-14 RX ORDER — FENTANYL CITRATE 50 UG/ML
INJECTION, SOLUTION INTRAMUSCULAR; INTRAVENOUS
Status: COMPLETED | OUTPATIENT
Start: 2025-05-14 | End: 2025-05-14

## 2025-05-14 RX ORDER — DIPHENHYDRAMINE HCL 25 MG
25 CAPSULE ORAL EVERY 4 HOURS PRN
Status: DISCONTINUED | OUTPATIENT
Start: 2025-05-14 | End: 2025-05-17 | Stop reason: HOSPADM

## 2025-05-14 RX ORDER — ONDANSETRON 2 MG/ML
4 INJECTION INTRAMUSCULAR; INTRAVENOUS EVERY 6 HOURS PRN
Status: DISCONTINUED | OUTPATIENT
Start: 2025-05-14 | End: 2025-05-14

## 2025-05-14 RX ORDER — TRANEXAMIC ACID 10 MG/ML
1000 INJECTION, SOLUTION INTRAVENOUS ONCE AS NEEDED
Status: DISCONTINUED | OUTPATIENT
Start: 2025-05-14 | End: 2025-05-14

## 2025-05-14 RX ORDER — KETOROLAC TROMETHAMINE 30 MG/ML
30 INJECTION, SOLUTION INTRAMUSCULAR; INTRAVENOUS ONCE
Status: COMPLETED | OUTPATIENT
Start: 2025-05-14 | End: 2025-05-14

## 2025-05-14 RX ORDER — OXYTOCIN/0.9 % SODIUM CHLORIDE 30/500 ML
250 PLASTIC BAG, INJECTION (ML) INTRAVENOUS CONTINUOUS
Status: DISPENSED | OUTPATIENT
Start: 2025-05-14 | End: 2025-05-14

## 2025-05-14 RX ORDER — CALCIUM CARBONATE 500 MG/1
1 TABLET, CHEWABLE ORAL EVERY 4 HOURS PRN
Status: DISCONTINUED | OUTPATIENT
Start: 2025-05-14 | End: 2025-05-17 | Stop reason: HOSPADM

## 2025-05-14 RX ORDER — CLINDAMYCIN PHOSPHATE 900 MG/50ML
900 INJECTION, SOLUTION INTRAVENOUS ONCE
Status: COMPLETED | OUTPATIENT
Start: 2025-05-14 | End: 2025-05-14

## 2025-05-14 RX ORDER — PHYTONADIONE 1 MG/.5ML
INJECTION, EMULSION INTRAMUSCULAR; INTRAVENOUS; SUBCUTANEOUS
Status: ACTIVE
Start: 2025-05-14 | End: 2025-05-14

## 2025-05-14 RX ORDER — NALOXONE HCL 0.4 MG/ML
0.2 VIAL (ML) INJECTION
Status: DISCONTINUED | OUTPATIENT
Start: 2025-05-14 | End: 2025-05-17 | Stop reason: HOSPADM

## 2025-05-14 RX ORDER — IBUPROFEN 600 MG/1
600 TABLET, FILM COATED ORAL EVERY 6 HOURS
Status: DISCONTINUED | OUTPATIENT
Start: 2025-05-15 | End: 2025-05-17 | Stop reason: HOSPADM

## 2025-05-14 RX ORDER — ERYTHROMYCIN 5 MG/G
OINTMENT OPHTHALMIC
Status: ACTIVE
Start: 2025-05-14 | End: 2025-05-14

## 2025-05-14 RX ORDER — MISOPROSTOL 200 UG/1
1000 TABLET ORAL ONCE
Status: DISCONTINUED | OUTPATIENT
Start: 2025-05-14 | End: 2025-05-17 | Stop reason: HOSPADM

## 2025-05-14 RX ORDER — ACETAMINOPHEN 500 MG
1000 TABLET ORAL EVERY 6 HOURS
Status: DISPENSED | OUTPATIENT
Start: 2025-05-14 | End: 2025-05-15

## 2025-05-14 RX ORDER — HYDROXYZINE HYDROCHLORIDE 50 MG/1
50 TABLET, FILM COATED ORAL EVERY 6 HOURS PRN
Status: DISCONTINUED | OUTPATIENT
Start: 2025-05-14 | End: 2025-05-17 | Stop reason: HOSPADM

## 2025-05-14 RX ORDER — MISOPROSTOL 200 UG/1
800 TABLET ORAL ONCE AS NEEDED
Status: DISCONTINUED | OUTPATIENT
Start: 2025-05-14 | End: 2025-05-14

## 2025-05-14 RX ORDER — CARBOPROST TROMETHAMINE 250 UG/ML
250 INJECTION, SOLUTION INTRAMUSCULAR ONCE
Status: DISCONTINUED | OUTPATIENT
Start: 2025-05-14 | End: 2025-05-17 | Stop reason: HOSPADM

## 2025-05-14 RX ORDER — MORPHINE SULFATE 4 MG/ML
INJECTION, SOLUTION INTRAMUSCULAR; INTRAVENOUS
Status: COMPLETED | OUTPATIENT
Start: 2025-05-14 | End: 2025-05-14

## 2025-05-14 RX ORDER — ALUMINA, MAGNESIA, AND SIMETHICONE 2400; 2400; 240 MG/30ML; MG/30ML; MG/30ML
15 SUSPENSION ORAL EVERY 4 HOURS PRN
Status: DISCONTINUED | OUTPATIENT
Start: 2025-05-14 | End: 2025-05-17 | Stop reason: HOSPADM

## 2025-05-14 RX ORDER — OXYCODONE HYDROCHLORIDE 5 MG/1
5 TABLET ORAL EVERY 4 HOURS PRN
Status: DISCONTINUED | OUTPATIENT
Start: 2025-05-14 | End: 2025-05-17 | Stop reason: HOSPADM

## 2025-05-14 RX ORDER — PRENATAL VIT/IRON FUM/FOLIC AC 27MG-0.8MG
1 TABLET ORAL DAILY
Status: DISCONTINUED | OUTPATIENT
Start: 2025-05-14 | End: 2025-05-17 | Stop reason: HOSPADM

## 2025-05-14 RX ORDER — SIMETHICONE 80 MG
80 TABLET,CHEWABLE ORAL 4 TIMES DAILY PRN
Status: DISCONTINUED | OUTPATIENT
Start: 2025-05-14 | End: 2025-05-17 | Stop reason: HOSPADM

## 2025-05-14 RX ORDER — METHYLERGONOVINE MALEATE 0.2 MG/ML
200 INJECTION INTRAVENOUS ONCE AS NEEDED
Status: DISCONTINUED | OUTPATIENT
Start: 2025-05-14 | End: 2025-05-14

## 2025-05-14 RX ORDER — SODIUM CHLORIDE, SODIUM LACTATE, POTASSIUM CHLORIDE, CALCIUM CHLORIDE 600; 310; 30; 20 MG/100ML; MG/100ML; MG/100ML; MG/100ML
INJECTION, SOLUTION INTRAVENOUS CONTINUOUS PRN
Status: DISCONTINUED | OUTPATIENT
Start: 2025-05-14 | End: 2025-05-14 | Stop reason: SURG

## 2025-05-14 RX ORDER — OXYCODONE HYDROCHLORIDE 10 MG/1
10 TABLET ORAL EVERY 4 HOURS PRN
Status: DISCONTINUED | OUTPATIENT
Start: 2025-05-14 | End: 2025-05-17 | Stop reason: HOSPADM

## 2025-05-14 RX ORDER — CARBOPROST TROMETHAMINE 250 UG/ML
250 INJECTION, SOLUTION INTRAMUSCULAR
Status: DISCONTINUED | OUTPATIENT
Start: 2025-05-14 | End: 2025-05-14

## 2025-05-14 RX ORDER — ONDANSETRON 2 MG/ML
4 INJECTION INTRAMUSCULAR; INTRAVENOUS EVERY 6 HOURS PRN
Status: DISCONTINUED | OUTPATIENT
Start: 2025-05-14 | End: 2025-05-14 | Stop reason: HOSPADM

## 2025-05-14 RX ORDER — ONDANSETRON 2 MG/ML
4 INJECTION INTRAMUSCULAR; INTRAVENOUS ONCE AS NEEDED
Status: DISCONTINUED | OUTPATIENT
Start: 2025-05-14 | End: 2025-05-17 | Stop reason: HOSPADM

## 2025-05-14 RX ORDER — OXYTOCIN/0.9 % SODIUM CHLORIDE 30/500 ML
250 PLASTIC BAG, INJECTION (ML) INTRAVENOUS CONTINUOUS
Status: ACTIVE | OUTPATIENT
Start: 2025-05-14 | End: 2025-05-14

## 2025-05-14 RX ORDER — ENOXAPARIN SODIUM 100 MG/ML
40 INJECTION SUBCUTANEOUS EVERY 12 HOURS
Status: DISCONTINUED | OUTPATIENT
Start: 2025-05-15 | End: 2025-05-17 | Stop reason: HOSPADM

## 2025-05-14 RX ORDER — OXYTOCIN/0.9 % SODIUM CHLORIDE 30/500 ML
999 PLASTIC BAG, INJECTION (ML) INTRAVENOUS ONCE
Status: COMPLETED | OUTPATIENT
Start: 2025-05-14 | End: 2025-05-14

## 2025-05-14 RX ADMIN — ONDANSETRON 4 MG: 2 INJECTION, SOLUTION INTRAMUSCULAR; INTRAVENOUS at 05:06

## 2025-05-14 RX ADMIN — SODIUM CHLORIDE, POTASSIUM CHLORIDE, SODIUM LACTATE AND CALCIUM CHLORIDE: 600; 310; 30; 20 INJECTION, SOLUTION INTRAVENOUS at 05:52

## 2025-05-14 RX ADMIN — FENTANYL CITRATE 20 MCG: 50 INJECTION, SOLUTION INTRAMUSCULAR; INTRAVENOUS at 05:46

## 2025-05-14 RX ADMIN — ACETAMINOPHEN 1000 MG: 500 TABLET, FILM COATED ORAL at 19:00

## 2025-05-14 RX ADMIN — GENTAMICIN SULFATE 428 MG: 40 INJECTION, SOLUTION INTRAMUSCULAR; INTRAVENOUS at 05:53

## 2025-05-14 RX ADMIN — SODIUM CHLORIDE, POTASSIUM CHLORIDE, SODIUM LACTATE AND CALCIUM CHLORIDE 999 ML/HR: 600; 310; 30; 20 INJECTION, SOLUTION INTRAVENOUS at 03:23

## 2025-05-14 RX ADMIN — SODIUM CHLORIDE, POTASSIUM CHLORIDE, SODIUM LACTATE AND CALCIUM CHLORIDE: 600; 310; 30; 20 INJECTION, SOLUTION INTRAVENOUS at 06:50

## 2025-05-14 RX ADMIN — AZITHROMYCIN MONOHYDRATE 500 MG: 500 INJECTION, POWDER, LYOPHILIZED, FOR SOLUTION INTRAVENOUS at 06:12

## 2025-05-14 RX ADMIN — KETOROLAC TROMETHAMINE 30 MG: 30 INJECTION, SOLUTION INTRAMUSCULAR at 07:43

## 2025-05-14 RX ADMIN — FAMOTIDINE 20 MG: 10 INJECTION, SOLUTION INTRAVENOUS at 05:09

## 2025-05-14 RX ADMIN — PHENYLEPHRINE HYDROCHLORIDE 100 MCG: 10 INJECTION INTRAVENOUS at 05:54

## 2025-05-14 RX ADMIN — MORPHINE SULFATE 150 MCG: 4 INJECTION, SOLUTION INTRAMUSCULAR; INTRAVENOUS at 05:46

## 2025-05-14 RX ADMIN — LIDOCAINE HYDROCHLORIDE 3 ML: 10; .005 INJECTION, SOLUTION EPIDURAL; INFILTRATION; INTRACAUDAL; PERINEURAL at 03:15

## 2025-05-14 RX ADMIN — KETOROLAC TROMETHAMINE 15 MG: 15 INJECTION INTRAMUSCULAR at 14:16

## 2025-05-14 RX ADMIN — SODIUM CHLORIDE, POTASSIUM CHLORIDE, SODIUM LACTATE AND CALCIUM CHLORIDE: 600; 310; 30; 20 INJECTION, SOLUTION INTRAVENOUS at 05:30

## 2025-05-14 RX ADMIN — DIPHENHYDRAMINE HYDROCHLORIDE 25 MG: 25 CAPSULE ORAL at 11:52

## 2025-05-14 RX ADMIN — Medication 125 ML/HR: at 07:35

## 2025-05-14 RX ADMIN — ACETAMINOPHEN 1000 MG: 500 TABLET, FILM COATED ORAL at 05:09

## 2025-05-14 RX ADMIN — ACETAMINOPHEN 1000 MG: 500 TABLET, FILM COATED ORAL at 11:50

## 2025-05-14 RX ADMIN — DIPHENHYDRAMINE HYDROCHLORIDE 25 MG: 25 CAPSULE ORAL at 20:30

## 2025-05-14 RX ADMIN — BUPIVACAINE HYDROCHLORIDE 1.6 ML: 7.5 INJECTION, SOLUTION EPIDURAL; RETROBULBAR at 05:46

## 2025-05-14 RX ADMIN — SENNOSIDES AND DOCUSATE SODIUM 1 TABLET: 50; 8.6 TABLET ORAL at 20:31

## 2025-05-14 RX ADMIN — Medication 12 ML/HR: at 03:19

## 2025-05-14 RX ADMIN — CLINDAMYCIN PHOSPHATE 900 MG: 900 INJECTION, SOLUTION INTRAVENOUS at 05:16

## 2025-05-14 RX ADMIN — Medication 999 ML/HR: at 06:10

## 2025-05-14 RX ADMIN — KETOROLAC TROMETHAMINE 15 MG: 15 INJECTION INTRAMUSCULAR at 20:30

## 2025-05-14 RX ADMIN — SENNOSIDES AND DOCUSATE SODIUM 1 TABLET: 50; 8.6 TABLET ORAL at 11:50

## 2025-05-14 NOTE — LACTATION NOTE
This note was copied from a baby's chart.  Pt called for assist latching baby.  Assisted with positioning and placed baby if football hold to left breast.  With a few attempts, baby latched well with deep jaw excursion and was comfortable for Mom. Encouraged to call for further assist as needed.

## 2025-05-14 NOTE — LACTATION NOTE
P1. Term baby. Pt reports baby has BF well x 2 so far. She reports baby tugging and denies pain. Encouraged to BF on demand, at least every 2-3 hours for 10-15 min on each breast. Educated on feeding cues, when to expect mature milk to come in, how to know if baby is getting enough to eat. Encouraged pt to review provided booklet on BF. Pt has [facility] info for f/u after d/c. Encouraged to call LC as needed.    Lactation Consult Note    Evaluation Completed: 2025 11:29 EDT  Patient Name: Jessica Gauthier  :  2000  MRN:  [id number]     REFERRAL  INFORMATION:                                         DELIVERY HISTORY:        Skin to skin initiation date/time: 2025 6:25 AM  Skin to skin end date/time:           MATERNAL ASSESSMENT:                               INFANT ASSESSMENT:  Information for the patient's :  Eusebia Gauthier [[id number]]   No past medical history on file.                                                                                                  MATERNAL INFANT FEEDING:                                                                       EQUIPMENT TYPE:                                 BREAST PUMPING:          LACTATION REFERRALS:

## 2025-05-14 NOTE — ANESTHESIA POSTPROCEDURE EVALUATION
"Patient: Jessica Gauthier    Procedure Summary       Date: 25 Room / Location:  [facility]    Anesthesia Start: 259 Anesthesia Stop: 659    Procedure:  SECTION PRIMARY (Abdomen) Diagnosis: (fetal intolerance)    Surgeons: Rachel Wilcox MD Provider: Yvon Hoang MD    Anesthesia Type: epidural ASA Status: 3            Anesthesia Type: epidural    Vitals  Vitals Value Taken Time   /71 25 11:40   Temp 36.5 °C (97.7 °F) 25 11:40   Pulse 92 25 11:40   Resp 16 25 11:40   SpO2 98 % 25 11:40           Post Anesthesia Care and Evaluation    Patient location during evaluation: bedside  Patient participation: complete - patient participated  Level of consciousness: awake and alert  Pain management: adequate    Airway patency: patent  Anesthetic complications: No anesthetic complications  PONV Status: controlled  Cardiovascular status: acceptable and hemodynamically stable  Respiratory status: acceptable, spontaneous ventilation and nonlabored ventilation  Hydration status: acceptable  Post Neuraxial Block status: Motor and sensory function returned to baseline and No signs or symptoms of PDPH  Comments: /71 (BP Location: Right arm, Patient Position: Lying)   Pulse 92   Temp 36.5 °C (97.7 °F) (Oral)   Resp 16   Ht 162.6 cm (64\")   Wt 132 kg (292 lb)   LMP 2024 (Exact Date)   SpO2 98%   Breastfeeding Unknown   BMI 50.12 kg/m²       "

## 2025-05-14 NOTE — OP NOTE
[facility]  Obstetrics and Gynecology     2025    Patient:Jessica Gauthier   MR#:[id number]     Section Operative Report    Indications: failed induction and non-reassuring fetal status    Pre-operative Diagnosis:   Intrauterine pregnancy at 38w0d  Labor status: Induced Onset of Labor    Gestational hypertension, third trimester    Anxiety and depression    Supervision of normal first pregnancy in third trimester    Hypercholesterolemia    Obesity in pregnancy, antepartum    Prediabetes    Susceptible to varicella (non-immune), currently pregnant    Carrier of spinal muscular atrophy    Pregnancy    Post-operative Diagnosis: same    Procedure:  Primary low transverse  section     Surgeon: Rachel Wilcox MD     Assistants:  Yu WHITNEY    Anesthesia: Spinal anesthesia    Prenatal care problem list:  Patient Active Problem List   Diagnosis    PMS (premenstrual syndrome)    Screening, ischemic heart disease    Fatigue    Anxiety and depression    Supervision of normal first pregnancy in third trimester    Hypercholesterolemia    Obesity in pregnancy, antepartum    Prediabetes    Susceptible to varicella (non-immune), currently pregnant    Carrier of spinal muscular atrophy    Carpal tunnel syndrome during pregnancy    Pregnancy    Gestational hypertension, third trimester     Procedure Details   The patient was seen in the LDR preoperatively. The risks, benefits, complications, treatment options, and expected outcomes were discussed with the patient.  The patient concurred with the proposed plan, giving informed consent.  The site of surgery is discussed. The patient was taken to Operating Room # 1, identified as Jessica Gauthier and the procedure verified as  Delivery. A Time Out was held and the above information confirmed. Antibiotic prophylaxis of clindamycin, gentamicin, and azithromycin was given. Vaginal prep completed.    Spinal anesthesia was placed in OR. The patient was draped and  prepped in the usual sterile manner. Adequate anesthesia was confirmed. A Pfannenstiel incision was made and carried down through the subcutaneous tissue to the fascia. Fascial incision was made and extended transversely. The peritoneum was identified and entered. Peritoneal incision was extended longitudinally. Albino retractor placed. A low transverse uterine incision was made. Delivered from vertex presentation was a male fetus 3020 g (6 lb 10.5 oz) with Apgar scores of 8 at one minute and 8 at five minutes. After the umbilical cord was clamped and cut, cord gases and cord blood were obtained for evaluation. The placenta was removed intact and appeared normal. The uterine outline was notable for fundal subserosal fibroid approximately 5cm, tubes and ovaries appeared normal.  The uterine incision was closed with running locked sutures of 0 vicryl followed by an imbricating layer with 0 monocryl. Hemostasis was observed. The gutters were cleared of clot. Albino retractor removed.    Peritoneum was closed with 2-0 Vicryl in a running fashion incorporating the muscle in the closure    The fascia was then reapproximated with running sutures of 0 PDS. The deep subcutaneous layer was irrigated and then reapproximated with 3-0 Vicryl in 2 layers. The skin was reapproximated with stratafix monocryl in a subcuticular fashion.     Instrument, sponge, and needle counts were correct prior the abdominal closure and at the conclusion of the case. Fundus firm.    Findings:  Viable male infant  Fibroid uterus, normal fallopian tubes and ovaries    Estimated Blood Loss:   400cc    Calculated Blood Loss:  pending RN documentation    Specimens:    Order Name Source Comment Collection Info Order Time   BLOOD GAS, ARTERIAL, CORD Umbilical Cord   2025  6:12 AM     Release to patient   Routine Release        BLOOD GAS, VENOUS, CORD Umbilical Cord   2025  6:12 AM     Release to patient   Routine Release             Complications:  None; patient tolerated the procedure well.           Disposition: PACU - hemodynamically stable.           Condition: stable    Cord gases:    pH, Cord Venous   Date Value Ref Range Status   05/14/2025 7.339 7.260 - 7.400 pH Units Final     Comment:     Serial Number: [id number]:  [id number]     Base Excess, Cord Venous   Date Value Ref Range Status   05/14/2025 -5.0 -30.0 - 30.0 mmol/L Final     pH, Cord Arterial   Date Value Ref Range Status   05/14/2025 7.30 7.18 - 7.34 pH Units Final     Comment:     Serial Number: [id number]:  [id number]     Base Exc, Cord Arterial   Date Value Ref Range Status   05/14/2025 -5.5 (L) -2.0 - 2.0 mmol/L Final   (  Rachel Wilcox MD  5/14/2025   07:06 EDT

## 2025-05-14 NOTE — PROGRESS NOTES
Patient with prolonged induction course for gestational hypertension.  Her membranes spontaneously ruptured at 0630 on  and Pitocin was started.  Pitocin reached a maximum of 30 and patient had progressed to 3 cm.  She remained unchanged after an hour of Pitocin at 30 so a 30-minute Pitocin break was given at  yesterday, the Pitocin was restarted at 10.  Her IUPC fell out and needed to be replaced, and that was the first time she had made change to 4/70/-2 at 2220 yesterday.  Attempt at vaginal delivery with induction was still attempted given reassuring maternal and fetal status.    At the 0325, fetus had 2 late decelerations with contractions that were 6 minutes apart followed by a 4-minute prolonged deceleration to the 100s at 0338.  Late decelerations continued with rare contractions until another 4-minute long prolonged deceleration at 0354.  These decelerations continued despite maternal repositioning, fluid bolus but did improve with oxytocin being turned off.  Fetal status is now category 1.    The patient meets criteria for a failed induction of labor given that she has had ruptured membranes and been on Pitocin for approximately 22 hours.  There is now concern for nonreassuring fetal status given late decelerations with contractions and multiple prolonged decelerations.  Presented bedside to discuss the above with patient and gave recommendation for a primary  section and patient was in agreement plan.    Patient with epidural in place though it was slow to begin working and pain relief has been minimal.  Since there is reassuring fetal status, plan per anesthesia is the turn epidural off for 1 hour and then attempt spinal placement in the operating room.  Preoperative orders placed with antibiotic prophylaxis with gentamicin, clindamycin, azithromycin given allergy with reaction of hives to penicillins and unknown ability to tolerate cephalosporins.  Patient was consented as below for  primary  section.  Will proceed to the OR when able.    The surgical procedure was discussed with the patient in detail.  I discussed the risks of the surgical procedure including, but not limited to the risk of pain, bleeding, infection, and damage to internal organs.  In exceedingly rare cases, death has been reported from surgical complications.     Rachel Wilcox MD  2025 05:01 EDT

## 2025-05-14 NOTE — ANESTHESIA PROCEDURE NOTES
Labor Epidural      Patient reassessed immediately prior to procedure    Patient location during procedure: OB  Start Time: 5/14/2025 2:59 AM  Stop Time: 5/14/2025 3:15 AM  Indication:at surgeon's request  Performed By  Anesthesiologist: Yvon Hoang MD  Preanesthetic Checklist  Completed: patient identified, IV checked, site marked, risks and benefits discussed, surgical consent, monitors and equipment checked, pre-op evaluation and timeout performed  Prep:  Pt Position:sitting  Sterile Tech:cap, mask, sterile barrier and gloves  Prep:chlorhexidine gluconate and isopropyl alcohol  Monitoring:blood pressure monitoring, continuous pulse oximetry and EKG  Epidural Block Procedure:  Approach:midline  Guidance:landmark technique and palpation technique  Location:L3-L4  Needle Type:Tuohy  Needle Gauge:17 G  Loss of Resistance Medium: saline  Loss of Resistance: 9cm  Cath Depth at skin:14 cm  Paresthesia: none  Aspiration:negative  Test Dose:negative  Number of Attempts: 1  Post Assessment:  Dressing:secured with tape and occlusive dressing applied  Pt Tolerance:patient tolerated the procedure well with no apparent complications  Complications:no

## 2025-05-14 NOTE — ANESTHESIA PROCEDURE NOTES
Spinal Block      Patient reassessed immediately prior to procedure    Patient location during procedure: OR  Start Time: 5/14/2025 5:41 AM  Stop Time: 5/14/2025 5:46 AM  Indication:at surgeon's request and post-op pain management  Performed By  Anesthesiologist: Yvon Hoang MD  Preanesthetic Checklist  Completed: patient identified, IV checked, site marked, risks and benefits discussed, surgical consent, monitors and equipment checked, pre-op evaluation and timeout performed  Spinal Block Prep:  Patient Position:sitting  Sterile Tech:cap, mask, sterile barriers and gloves  Prep:Chloraprep  Patient Monitoring:blood pressure monitoring, continuous pulse oximetry and EKG    Spinal Block Procedure  Approach:midline  Guidance:landmark technique and palpation technique  Location:L3-L4  Needle Type:Pencan  Needle Gauge:24 G  Placement of Spinal needle event:cerebrospinal fluid aspirated  Paresthesia: no  Fluid Appearance:clear  Medications: fentaNYL citrate (PF) (SUBLIMAZE) injection - Intrathecal   20 mcg - 5/14/2025 5:46:00 AM  Morphine sulfate (PF) injection - Spinal   150 mcg - 5/14/2025 5:46:00 AM  bupivacaine PF (MARCAINE) 0.75 % injection - Spinal   1.6 mL - 5/14/2025 5:46:00 AM   Post Assessment  Patient Tolerance:patient tolerated the procedure well with no apparent complications  Complications no  Additional Notes  Epidural was off for 1 hour prior to spinal  Epidural removed when sitting up in OR, catheter tip intact

## 2025-05-14 NOTE — PLAN OF CARE
Problem: Adult Inpatient Plan of Care  Goal: Plan of Care Review  Outcome: Progressing  Flowsheets (Taken 5/14/2025 1750)  Outcome Evaluation: VSS, fundus and lochia wnl, pain well controlled with ERAS medications, pt ambulated to bathroom, lebron catheter still in place, bonding well with baby  Goal: Patient-Specific Goal (Individualized)  Outcome: Progressing  Goal: Absence of Hospital-Acquired Illness or Injury  Outcome: Progressing  Intervention: Identify and Manage Fall Risk  Recent Flowsheet Documentation  Taken 5/14/2025 1600 by Shonda Reese RN  Safety Promotion/Fall Prevention: safety round/check completed  Taken 5/14/2025 1416 by Shonda Reese RN  Safety Promotion/Fall Prevention: safety round/check completed  Taken 5/14/2025 1340 by Shonda Reese RN  Safety Promotion/Fall Prevention: safety round/check completed  Taken 5/14/2025 1240 by Shonda Reese RN  Safety Promotion/Fall Prevention: safety round/check completed  Taken 5/14/2025 1150 by Shonda Reese RN  Safety Promotion/Fall Prevention: safety round/check completed  Taken 5/14/2025 1140 by Shonda Reese RN  Safety Promotion/Fall Prevention: safety round/check completed  Taken 5/14/2025 1040 by Shonda Reese RN  Safety Promotion/Fall Prevention: safety round/check completed  Taken 5/14/2025 0940 by Shonda Reese RN  Safety Promotion/Fall Prevention: safety round/check completed  Taken 5/14/2025 0910 by Shonda Reese RN  Safety Promotion/Fall Prevention: safety round/check completed  Intervention: Prevent and Manage VTE (Venous Thromboembolism) Risk  Recent Flowsheet Documentation  Taken 5/14/2025 1340 by Shonda Reese RN  VTE Prevention/Management:   bilateral   SCDs (sequential compression devices) on  Taken 5/14/2025 1240 by Shonda Reese RN  VTE Prevention/Management:   bilateral   SCDs (sequential compression devices) on  Taken 5/14/2025 1040 by Shonda Reese RN  VTE Prevention/Management:   bilateral   SCDs (sequential compression  devices) on  Taken 5/14/2025 0940 by Shonda Reese RN  VTE Prevention/Management:   bilateral   SCDs (sequential compression devices) on  Taken 5/14/2025 0910 by Shonda Reese RN  VTE Prevention/Management:   bilateral   SCDs (sequential compression devices) on  Goal: Optimal Comfort and Wellbeing  Outcome: Progressing  Intervention: Monitor Pain and Promote Comfort  Recent Flowsheet Documentation  Taken 5/14/2025 1416 by Shonda Reese RN  Pain Management Interventions: pain medication given  Taken 5/14/2025 1340 by Shonda Reese RN  Pain Management Interventions: pain management plan reviewed with patient/caregiver  Taken 5/14/2025 1240 by Shonda Reese RN  Pain Management Interventions: pain management plan reviewed with patient/caregiver  Taken 5/14/2025 1150 by Shonda Reese RN  Pain Management Interventions: pain medication given  Taken 5/14/2025 1040 by Shonda Reese RN  Pain Management Interventions: pain management plan reviewed with patient/caregiver  Taken 5/14/2025 0940 by Shonda Reese RN  Pain Management Interventions: pain medication given  Taken 5/14/2025 0910 by Shonda Reese RN  Pain Management Interventions: pain management plan reviewed with patient/caregiver  Intervention: Provide Person-Centered Care  Recent Flowsheet Documentation  Taken 5/14/2025 1340 by Shonda Reese RN  Trust Relationship/Rapport:   care explained   questions answered   questions encouraged   thoughts/feelings acknowledged  Taken 5/14/2025 1240 by Shonda Reese RN  Trust Relationship/Rapport:   care explained   questions answered   questions encouraged   thoughts/feelings acknowledged  Taken 5/14/2025 1150 by Shonda Reese RN  Trust Relationship/Rapport:   care explained   questions answered   questions encouraged  Taken 5/14/2025 1040 by Shonda Reese RN  Trust Relationship/Rapport:   care explained   questions answered   questions encouraged   thoughts/feelings acknowledged  Taken 5/14/2025 0940 by Hugh  [employer]  Trust Relationship/Rapport:   care explained   questions answered   questions encouraged   thoughts/feelings acknowledged  Taken 5/14/2025 0910 by Shonda Reese RN  Trust Relationship/Rapport:   care explained   questions answered   questions encouraged   thoughts/feelings acknowledged  Goal: Readiness for Transition of Care  Outcome: Progressing     Problem: Pain Acute  Goal: Optimal Pain Control and Function  Outcome: Progressing  Intervention: Optimize Psychosocial Wellbeing  Recent Flowsheet Documentation  Taken 5/14/2025 1340 by Shonda Reese RN  Diversional Activities:   television   smartphone  Taken 5/14/2025 1240 by Shonda Reese RN  Diversional Activities:   television   smartphone  Taken 5/14/2025 1040 by Shonda Reese RN  Diversional Activities:   television   smartphone  Taken 5/14/2025 0940 by Shonda Reese RN  Diversional Activities:   television   smartphone  Taken 5/14/2025 0910 by Shonda Reese RN  Diversional Activities:   smartphone   television  Intervention: Develop Pain Management Plan  Recent Flowsheet Documentation  Taken 5/14/2025 1416 by Shonda Reese RN  Pain Management Interventions: pain medication given  Taken 5/14/2025 1340 by Shonda Reese RN  Pain Management Interventions: pain management plan reviewed with patient/caregiver  Taken 5/14/2025 1240 by Shonda Reese RN  Pain Management Interventions: pain management plan reviewed with patient/caregiver  Taken 5/14/2025 1150 by Shonda Reese RN  Pain Management Interventions: pain medication given  Taken 5/14/2025 1040 by Shonda Reese RN  Pain Management Interventions: pain management plan reviewed with patient/caregiver  Taken 5/14/2025 0940 by Shonda Reese RN  Pain Management Interventions: pain medication given  Taken 5/14/2025 0910 by Shonda Reese RN  Pain Management Interventions: pain management plan reviewed with patient/caregiver  Intervention: Prevent or Manage Pain  Recent Flowsheet  Documentation  Taken 2025 1600 by Shonda Reese RN  Medication Review/Management: medications reviewed  Taken 2025 1416 by Shonda Reese RN  Medication Review/Management: medications reviewed  Taken 2025 1150 by Shonda Reese RN  Medication Review/Management: medications reviewed  Taken 2025 1140 by Shonda Reese RN  Medication Review/Management: medications reviewed  Taken 2025 1040 by Shonda Reese RN  Medication Review/Management: medications reviewed  Taken 2025 0910 by Shonda Reese RN  Medication Review/Management: medications reviewed     Problem: Labor  Goal: Hemostasis  Outcome: Progressing  Goal: Stable Fetal Wellbeing  Outcome: Progressing  Goal: Effective Progression to Delivery  Outcome: Progressing  Goal: Absence of Infection Signs and Symptoms  Outcome: Progressing  Goal: Acceptable Pain Control  Outcome: Progressing  Goal: Normal Uterine Contraction Pattern  Outcome: Progressing     Problem:  Fall Injury Risk  Goal: Absence of Fall, Infant Drop and Related Injury  Outcome: Progressing  Intervention: Identify and Manage Contributors  Recent Flowsheet Documentation  Taken 2025 1600 by Shonda Reese RN  Medication Review/Management: medications reviewed  Taken 2025 1416 by Shonda Reese RN  Medication Review/Management: medications reviewed  Taken 2025 1150 by Shonda Reese RN  Medication Review/Management: medications reviewed  Taken 2025 1140 by Shonda Reese RN  Medication Review/Management: medications reviewed  Taken 2025 1040 by Shonda Reese RN  Medication Review/Management: medications reviewed  Taken 2025 0910 by Shonda Reese RN  Medication Review/Management: medications reviewed  Intervention: Promote Injury-Free Environment  Recent Flowsheet Documentation  Taken 2025 1600 by Shonda Reese RN  Safety Promotion/Fall Prevention: safety round/check completed  Taken 2025 1416 by Shonda Reese RN  Safety  Promotion/Fall Prevention: safety round/check completed  Taken 2025 1340 by Shonda Reese RN  Safety Promotion/Fall Prevention: safety round/check completed  Taken 2025 1240 by Shonda Reese RN  Safety Promotion/Fall Prevention: safety round/check completed  Taken 2025 1150 by Shonda Reese RN  Safety Promotion/Fall Prevention: safety round/check completed  Taken 2025 1140 by Shonda Reese RN  Safety Promotion/Fall Prevention: safety round/check completed  Taken 2025 1040 by Shonda Reese RN  Safety Promotion/Fall Prevention: safety round/check completed  Taken 2025 0940 by Shonda Reese RN  Safety Promotion/Fall Prevention: safety round/check completed  Taken 2025 0910 by Shonda Reese RN  Safety Promotion/Fall Prevention: safety round/check completed     Problem: Skin Injury Risk Increased  Goal: Skin Health and Integrity  Outcome: Progressing  Intervention: Optimize Skin Protection  Recent Flowsheet Documentation  Taken 2025 1600 by Shonda Reese RN  Activity Management: bedrest  Taken 2025 1416 by Shonda Reese RN  Activity Management: bedrest  Taken 2025 1340 by Shonda Reese RN  Activity Management: bedrest  Taken 2025 1240 by Shonda Reese RN  Activity Management: bedrest  Taken 2025 1140 by Shonda Reese RN  Activity Management: bedrest  Taken 2025 1040 by Shonda Reese RN  Activity Management: bedrest  Taken 2025 0940 by Shonda Reese RN  Activity Management: bedrest  Taken 2025 0910 by Shonda Reese RN  Activity Management: bedrest     Problem:  Delivery  Goal: Bleeding is Controlled  Outcome: Progressing  Goal: Stable Fetal Wellbeing  Outcome: Progressing  Goal: Absence of Infection Signs and Symptoms  Outcome: Progressing  Goal: Effective Oxygenation and Ventilation  Outcome: Progressing   Goal Outcome Evaluation:              Outcome Evaluation: VSS, fundus and lochia wnl, pain well controlled with ERAS  medications, pt ambulated to bathroom, lebron catheter still in place, bonding well with baby

## 2025-05-14 NOTE — LACTATION NOTE
Lactation Consult Note  Pt called for assist latching baby.  Educated on positioning, importance of a deep vs shallow latch, and to start nipple to nose to achieve a deep latch.  Placed baby in cross cradle hold to right breast and assisted with latching.  Baby did well with deep jaw excursion but came off breast.  Mom had difficulty re latching so tried football hold and was able to latch independently.  Educated to feed on demand whenever baby is showing hunger cues and to call for assist as needed.    Evaluation Completed: 2025 14:00 EDT  Patient Name: Jessica Gauthier  :  2000  MRN:  [id number]     REFERRAL  INFORMATION:                          Date of Referral: 25   Person Making Referral: patient  Maternal Reason for Referral: latch difficulty       DELIVERY HISTORY:        Skin to skin initiation date/time: 2025 6:25 AM  Skin to skin end date/time:           MATERNAL ASSESSMENT:  Breast Size Issue: none (25 134)  Breast Shape: Bilateral:, pendulous (25 134)  Breast Density: Bilateral:, soft (25 134)  Areola: Bilateral:, elastic (25 134)  Nipples: Bilateral:, graspable (25 134)     Left Nipple Symptoms: intact (25 134)  Right Nipple Symptoms: intact (25 134)       INFANT ASSESSMENT:  Information for the patient's :  Eusebia Gauthier [[id number]]   No past medical history on file.  Feeding Readiness Cues: eager, quiet (25 134)     Feeding Tolerance/Success: alert for feeding, coordinated suck/swallow/breathing, intermittent pauses (25 134)              Feeding Interventions: latch assistance provided (25 134)  Nutrition Interventions: lactation consult initiated (25 134)           Breastfeeding: breastfeeding, right side only (25 134)  Infant Positioning: cross-cradle, clutch/football (25 134)        Effective Latch During Feeding: yes (25 134)  Suck/Swallow/Breathing Coordination:  present (25)  Signs of Milk Transfer: deep jaw excursions noted (25)      Latch: 2-->grasps breast, tongue down, lips flanged, rhythmic sucking (25)  Audible Swallowin-->none (25)  Type of Nipple: 2-->everted (after stimulation) (25)  Comfort (Breast/Nipple): 2-->soft/nontender (25)  Hold (Positioning): 0-->full assist (staff holds infant at breast) (25)  Latch Score: 6 (25)                   MATERNAL INFANT FEEDING:     Maternal Emotional State: receptive, relaxed (25)  Infant Positioning: cross-cradle, clutch/football (25)   Signs of Milk Transfer: deep jaw excursions noted (25)  Pain with Feeding: no (25)           Milk Ejection Reflex: absent with colostrum (25)           Latch Assistance: full assistance needed (25)                               EQUIPMENT TYPE:                                 BREAST PUMPING:          LACTATION REFERRALS:

## 2025-05-14 NOTE — ANESTHESIA PREPROCEDURE EVALUATION
Anesthesia Evaluation     Patient summary reviewed and Nursing notes reviewed                Airway   Mallampati: II  TM distance: >3 FB  Neck ROM: full  Possible difficult intubation and Large neck circumference  Dental - normal exam     Pulmonary - negative pulmonary ROS and normal exam   Cardiovascular - normal exam  Exercise tolerance: good (4-7 METS)    (+) hypertension, hyperlipidemia      Neuro/Psych  (+) headaches, psychiatric history  GI/Hepatic/Renal/Endo    (+) morbid obesity    Musculoskeletal (-) negative ROS    Abdominal   (+) obese   Substance History - negative use     OB/GYN    (+) Pregnant        Other                    Anesthesia Plan    ASA 3     epidural     (38w0d  )    Anesthetic plan, risks, benefits, and alternatives have been provided, discussed and informed consent has been obtained with: patient.    CODE STATUS:    Code Status (Patient has no pulse and is not breathing): CPR (Attempt to Resuscitate)  Medical Interventions (Patient has pulse or is breathing): Full Support  Level Of Support Discussed With: Patient

## 2025-05-15 LAB
BASOPHILS # BLD AUTO: 0.03 10*3/MM3 (ref 0–0.2)
BASOPHILS NFR BLD AUTO: 0.4 % (ref 0–1.5)
DEPRECATED RDW RBC AUTO: 45.6 FL (ref 37–54)
EOSINOPHIL # BLD AUTO: 0.12 10*3/MM3 (ref 0–0.4)
EOSINOPHIL NFR BLD AUTO: 1.7 % (ref 0.3–6.2)
ERYTHROCYTE [DISTWIDTH] IN BLOOD BY AUTOMATED COUNT: 13.8 % (ref 12.3–15.4)
HCT VFR BLD AUTO: 31.4 % (ref 34–46.6)
HGB BLD-MCNC: 10.6 G/DL (ref 12–15.9)
IMM GRANULOCYTES # BLD AUTO: 0.05 10*3/MM3 (ref 0–0.05)
IMM GRANULOCYTES NFR BLD AUTO: 0.7 % (ref 0–0.5)
LYMPHOCYTES # BLD AUTO: 1.48 10*3/MM3 (ref 0.7–3.1)
LYMPHOCYTES NFR BLD AUTO: 20.4 % (ref 19.6–45.3)
MCH RBC QN AUTO: 30.6 PG (ref 26.6–33)
MCHC RBC AUTO-ENTMCNC: 33.8 G/DL (ref 31.5–35.7)
MCV RBC AUTO: 90.8 FL (ref 79–97)
MONOCYTES # BLD AUTO: 0.61 10*3/MM3 (ref 0.1–0.9)
MONOCYTES NFR BLD AUTO: 8.4 % (ref 5–12)
NEUTROPHILS NFR BLD AUTO: 4.98 10*3/MM3 (ref 1.7–7)
NEUTROPHILS NFR BLD AUTO: 68.4 % (ref 42.7–76)
NRBC BLD AUTO-RTO: 0 /100 WBC (ref 0–0.2)
PLATELET # BLD AUTO: 167 10*3/MM3 (ref 140–450)
PMV BLD AUTO: 11.4 FL (ref 6–12)
RBC # BLD AUTO: 3.46 10*6/MM3 (ref 3.77–5.28)
WBC NRBC COR # BLD AUTO: 7.27 10*3/MM3 (ref 3.4–10.8)

## 2025-05-15 PROCEDURE — 85025 COMPLETE CBC W/AUTO DIFF WBC: CPT | Performed by: STUDENT IN AN ORGANIZED HEALTH CARE EDUCATION/TRAINING PROGRAM

## 2025-05-15 PROCEDURE — 25010000002 KETOROLAC TROMETHAMINE PER 15 MG: Performed by: STUDENT IN AN ORGANIZED HEALTH CARE EDUCATION/TRAINING PROGRAM

## 2025-05-15 PROCEDURE — 25010000002 ENOXAPARIN PER 10 MG: Performed by: STUDENT IN AN ORGANIZED HEALTH CARE EDUCATION/TRAINING PROGRAM

## 2025-05-15 RX ORDER — NIFEDIPINE 10 MG/1
10-20 CAPSULE ORAL
Status: DISCONTINUED | OUTPATIENT
Start: 2025-05-15 | End: 2025-05-17 | Stop reason: HOSPADM

## 2025-05-15 RX ORDER — ACETAMINOPHEN 325 MG/1
650 TABLET ORAL EVERY 6 HOURS
Status: DISCONTINUED | OUTPATIENT
Start: 2025-05-15 | End: 2025-05-15

## 2025-05-15 RX ORDER — ACETAMINOPHEN 325 MG/1
650 TABLET ORAL EVERY 6 HOURS
Status: DISCONTINUED | OUTPATIENT
Start: 2025-05-15 | End: 2025-05-17 | Stop reason: HOSPADM

## 2025-05-15 RX ORDER — HYDRALAZINE HYDROCHLORIDE 20 MG/ML
5-10 INJECTION INTRAMUSCULAR; INTRAVENOUS
Status: DISCONTINUED | OUTPATIENT
Start: 2025-05-15 | End: 2025-05-17 | Stop reason: HOSPADM

## 2025-05-15 RX ORDER — LABETALOL HYDROCHLORIDE 5 MG/ML
20-80 INJECTION, SOLUTION INTRAVENOUS
Status: DISCONTINUED | OUTPATIENT
Start: 2025-05-15 | End: 2025-05-17 | Stop reason: HOSPADM

## 2025-05-15 RX ORDER — NIFEDIPINE 30 MG/1
30 TABLET, EXTENDED RELEASE ORAL 2 TIMES DAILY
Status: DISCONTINUED | OUTPATIENT
Start: 2025-05-15 | End: 2025-05-17 | Stop reason: HOSPADM

## 2025-05-15 RX ADMIN — ACETAMINOPHEN 1000 MG: 500 TABLET, FILM COATED ORAL at 08:13

## 2025-05-15 RX ADMIN — ACETAMINOPHEN 1000 MG: 500 TABLET, FILM COATED ORAL at 02:53

## 2025-05-15 RX ADMIN — OXYCODONE HYDROCHLORIDE 5 MG: 5 TABLET ORAL at 09:14

## 2025-05-15 RX ADMIN — ACETAMINOPHEN 650 MG: 325 TABLET, FILM COATED ORAL at 21:01

## 2025-05-15 RX ADMIN — KETOROLAC TROMETHAMINE 15 MG: 15 INJECTION INTRAMUSCULAR at 02:53

## 2025-05-15 RX ADMIN — IBUPROFEN 600 MG: 600 TABLET ORAL at 11:41

## 2025-05-15 RX ADMIN — IBUPROFEN 600 MG: 600 TABLET ORAL at 23:08

## 2025-05-15 RX ADMIN — ENOXAPARIN SODIUM 40 MG: 100 INJECTION SUBCUTANEOUS at 21:00

## 2025-05-15 RX ADMIN — PRENATAL VITAMINS-IRON FUMARATE 27 MG IRON-FOLIC ACID 0.8 MG TABLET 1 TABLET: at 08:13

## 2025-05-15 RX ADMIN — ENOXAPARIN SODIUM 40 MG: 100 INJECTION SUBCUTANEOUS at 09:14

## 2025-05-15 RX ADMIN — SENNOSIDES AND DOCUSATE SODIUM 1 TABLET: 50; 8.6 TABLET ORAL at 21:00

## 2025-05-15 RX ADMIN — NIFEDIPINE 30 MG: 30 TABLET, EXTENDED RELEASE ORAL at 21:00

## 2025-05-15 RX ADMIN — IBUPROFEN 600 MG: 600 TABLET ORAL at 17:08

## 2025-05-15 RX ADMIN — ACETAMINOPHEN 650 MG: 325 TABLET, FILM COATED ORAL at 14:16

## 2025-05-15 RX ADMIN — SENNOSIDES AND DOCUSATE SODIUM 1 TABLET: 50; 8.6 TABLET ORAL at 08:13

## 2025-05-15 NOTE — PLAN OF CARE
Goal Outcome Evaluation:   VSS. Pain controlled with ERAS. Fundus and lochia within normal limits. Ambulating independently. DTV 0940. Breastfeeding/ bottle feeding. No concerns at this time

## 2025-05-15 NOTE — PROGRESS NOTES
" POSTPARTUM ROUNDS    CC: POD 1 from CS    SUBJECTIVE:  Pain is controlled. The patient is tolerating a regular diet.  No nausea or vomiting. She is ambulating. Her lochia is normal.     ROS:  No leg pain, no HA, no N/V, no F/C, lochia normal    OBJECTIVE:  Vital Signs: /88 (BP Location: Right arm, Patient Position: Lying)   Pulse 86   Temp 98.2 °F (36.8 °C) (Oral)   Resp 16   Ht 162.6 cm (64\")   Wt 132 kg (292 lb)   LMP 2024 (Exact Date)   SpO2 100%   Breastfeeding Yes   BMI 50.12 kg/m²     Intake/Output Summary (Last 24 hours) at 5/15/2025 1227  Last data filed at 5/15/2025 0430  Gross per 24 hour   Intake --   Output 1100 ml   Net -1100 ml       Constitutional: The patient is well nourished., alert and oriented and no distress  Cardiovascular:RRR  Resp: nonlabored breathing  The incision is covered with clean, dry and Intact dressing  Abdomen: fundus firm below umbilicus, soft , ND, fundus non-tender  Extremities: trace  edema, non-tender bilateral    LABS / IMAGING:  Lab Results (last 24 hours)       Procedure Component Value Units Date/Time    CBC & Differential [[id number]]  (Abnormal) Collected: 05/15/25 0606    Specimen: Blood Updated: 05/15/25 0657    Narrative:      The following orders were created for panel order CBC & Differential.  Procedure                               Abnormality         Status                     ---------                               -----------         ------                     CBC Auto Differential[[id number]]        Abnormal            Final result                 Please view results for these tests on the individual orders.    CBC Auto Differential [[id number]]  (Abnormal) Collected: 05/15/25 0606    Specimen: Blood Updated: 05/15/25 0657     WBC 7.27 10*3/mm3      RBC 3.46 10*6/mm3      Hemoglobin 10.6 g/dL      Hematocrit 31.4 %      MCV 90.8 fL      MCH 30.6 pg      MCHC 33.8 g/dL      RDW 13.8 %      RDW-SD 45.6 fl      MPV 11.4 fL      " Platelets 167 10*3/mm3      Neutrophil % 68.4 %      Lymphocyte % 20.4 %      Monocyte % 8.4 %      Eosinophil % 1.7 %      Basophil % 0.4 %      Immature Grans % 0.7 %      Neutrophils, Absolute 4.98 10*3/mm3      Lymphocytes, Absolute 1.48 10*3/mm3      Monocytes, Absolute 0.61 10*3/mm3      Eosinophils, Absolute 0.12 10*3/mm3      Basophils, Absolute 0.03 10*3/mm3      Immature Grans, Absolute 0.05 10*3/mm3      nRBC 0.0 /100 WBC             ASSESSMENT AND PLAN:    Patient Active Problem List   Diagnosis    PMS (premenstrual syndrome)    Screening, ischemic heart disease    Fatigue    Anxiety and depression    Supervision of normal first pregnancy in third trimester    Hypercholesterolemia    Obesity in pregnancy, antepartum    Prediabetes    Susceptible to varicella (non-immune), currently pregnant    Carrier of spinal muscular atrophy    Carpal tunnel syndrome during pregnancy    Pregnancy    Gestational hypertension, third trimester       Patient is postop day 1 from LTCS  Patient is doing well    Plan:  Regular diet   Encourage ambulation     Desires infant circ, defer to tomorrow per nursing due to blood glucose evaluations today    Bárbara Cason MD    5/15/2025  12:27 EDT

## 2025-05-15 NOTE — LACTATION NOTE
This note was copied from a baby's chart.  P1 term baby, 28hrs old. Mom reports baby had low blood sugars and needed formula supplementation. She is trying to breast feed before the formula but baby has been sleepy. We attempted latching in CC position after hand expressing milk but baby was not interested. BGM was 66 at the time per Mom.  Dad gave baby formula.  Discussed milk production, encouraged pumping every 3hrs until baby begins waking and breast feeding better. Hand pump given. Mom pumped 1ml after 30 minutes of pumping and some milk given to baby via syringe but he was still sleepy.  Encouraged giving the rest of EBM with next feeding. Discussed cleaning of pump parts. Encouraged to call for further assist or questions.

## 2025-05-16 PROCEDURE — 25010000002 ENOXAPARIN PER 10 MG: Performed by: STUDENT IN AN ORGANIZED HEALTH CARE EDUCATION/TRAINING PROGRAM

## 2025-05-16 PROCEDURE — 25810000003 LACTATED RINGERS PER 1000 ML: Performed by: STUDENT IN AN ORGANIZED HEALTH CARE EDUCATION/TRAINING PROGRAM

## 2025-05-16 PROCEDURE — 25010000002 MAGNESIUM SULFATE 20 GM/500ML SOLUTION: Performed by: OBSTETRICS & GYNECOLOGY

## 2025-05-16 PROCEDURE — 25010000002 HYDRALAZINE PER 20 MG: Performed by: OBSTETRICS & GYNECOLOGY

## 2025-05-16 RX ORDER — SODIUM CHLORIDE, SODIUM LACTATE, POTASSIUM CHLORIDE, CALCIUM CHLORIDE 600; 310; 30; 20 MG/100ML; MG/100ML; MG/100ML; MG/100ML
75 INJECTION, SOLUTION INTRAVENOUS CONTINUOUS
Status: DISCONTINUED | OUTPATIENT
Start: 2025-05-16 | End: 2025-05-17 | Stop reason: HOSPADM

## 2025-05-16 RX ORDER — MAGNESIUM SULFATE HEPTAHYDRATE 40 MG/ML
2 INJECTION, SOLUTION INTRAVENOUS CONTINUOUS
Status: DISPENSED | OUTPATIENT
Start: 2025-05-16 | End: 2025-05-17

## 2025-05-16 RX ORDER — MAGNESIUM SULFATE HEPTAHYDRATE 40 MG/ML
2 INJECTION, SOLUTION INTRAVENOUS CONTINUOUS
Status: DISCONTINUED | OUTPATIENT
Start: 2025-05-16 | End: 2025-05-16 | Stop reason: SDUPTHER

## 2025-05-16 RX ORDER — BUTALBITAL, ACETAMINOPHEN AND CAFFEINE 50; 325; 40 MG/1; MG/1; MG/1
2 TABLET ORAL ONCE
Status: DISCONTINUED | OUTPATIENT
Start: 2025-05-16 | End: 2025-05-17 | Stop reason: HOSPADM

## 2025-05-16 RX ORDER — CALCIUM GLUCONATE 94 MG/ML
1 INJECTION, SOLUTION INTRAVENOUS AS NEEDED
Status: DISCONTINUED | OUTPATIENT
Start: 2025-05-16 | End: 2025-05-17 | Stop reason: HOSPADM

## 2025-05-16 RX ADMIN — SENNOSIDES AND DOCUSATE SODIUM 1 TABLET: 50; 8.6 TABLET ORAL at 09:05

## 2025-05-16 RX ADMIN — MAGNESIUM SULFATE HEPTAHYDRATE 2 G/HR: 40 INJECTION, SOLUTION INTRAVENOUS at 19:15

## 2025-05-16 RX ADMIN — ACETAMINOPHEN 650 MG: 325 TABLET, FILM COATED ORAL at 21:25

## 2025-05-16 RX ADMIN — MAGNESIUM SULFATE HEPTAHYDRATE 2 G/HR: 40 INJECTION, SOLUTION INTRAVENOUS at 00:58

## 2025-05-16 RX ADMIN — NIFEDIPINE 30 MG: 30 TABLET, EXTENDED RELEASE ORAL at 17:33

## 2025-05-16 RX ADMIN — ACETAMINOPHEN 650 MG: 325 TABLET, FILM COATED ORAL at 09:05

## 2025-05-16 RX ADMIN — SODIUM CHLORIDE, POTASSIUM CHLORIDE, SODIUM LACTATE AND CALCIUM CHLORIDE 75 ML/HR: 600; 310; 30; 20 INJECTION, SOLUTION INTRAVENOUS at 10:01

## 2025-05-16 RX ADMIN — IBUPROFEN 600 MG: 600 TABLET ORAL at 18:08

## 2025-05-16 RX ADMIN — IBUPROFEN 600 MG: 600 TABLET ORAL at 06:03

## 2025-05-16 RX ADMIN — HYDRALAZINE HYDROCHLORIDE 5 MG: 20 INJECTION INTRAMUSCULAR; INTRAVENOUS at 00:03

## 2025-05-16 RX ADMIN — ENOXAPARIN SODIUM 40 MG: 100 INJECTION SUBCUTANEOUS at 09:05

## 2025-05-16 RX ADMIN — SODIUM CHLORIDE, POTASSIUM CHLORIDE, SODIUM LACTATE AND CALCIUM CHLORIDE 75 ML/HR: 600; 310; 30; 20 INJECTION, SOLUTION INTRAVENOUS at 14:03

## 2025-05-16 RX ADMIN — PRENATAL VITAMINS-IRON FUMARATE 27 MG IRON-FOLIC ACID 0.8 MG TABLET 1 TABLET: at 09:05

## 2025-05-16 RX ADMIN — ENOXAPARIN SODIUM 40 MG: 100 INJECTION SUBCUTANEOUS at 21:26

## 2025-05-16 RX ADMIN — SENNOSIDES AND DOCUSATE SODIUM 1 TABLET: 50; 8.6 TABLET ORAL at 21:25

## 2025-05-16 RX ADMIN — NIFEDIPINE 30 MG: 30 TABLET, EXTENDED RELEASE ORAL at 09:05

## 2025-05-16 RX ADMIN — ACETAMINOPHEN 650 MG: 325 TABLET, FILM COATED ORAL at 03:43

## 2025-05-16 RX ADMIN — IBUPROFEN 600 MG: 600 TABLET ORAL at 12:05

## 2025-05-16 RX ADMIN — ACETAMINOPHEN 650 MG: 325 TABLET, FILM COATED ORAL at 15:17

## 2025-05-16 RX ADMIN — MAGNESIUM SULFATE HEPTAHYDRATE 2 G/HR: 40 INJECTION, SOLUTION INTRAVENOUS at 09:06

## 2025-05-16 NOTE — PROGRESS NOTES
[facility]  Postpartum Note    Subjective   Postpartum Day 2:  Primary Low Transverse  Section      Patient with severe pre-eclampsia diagnosed overnight for sustained elevated Bps. Was started on Magnesium and 30xl procardia daily.     Patient feeling without complaints.     Her pain is well controlled with nonsteroidal anti-inflammatory drugs, Tylenol, and opioid analgesics. She is ambulating and voiding well.  Bleeding is appropriate for pp period.      Objective     Vitals:  Vitals:    25 0900 25 1000 25 1100 25 1200   BP: 138/89 143/78  129/64   BP Location:       Patient Position:       Pulse: 117 119  107   Resp:       Temp:   98.3 °F (36.8 °C)    TempSrc:   Oral    SpO2:       Weight:       Height:           Physical Exam:  General:  no acute distress.  Abdomen: Fundus firm below umbilicus, nontender, benign non-tender, without masses or organomegaly palpable, incision clean, dry, and intact, and without erythema  Extremities: moves all extremities well, no cyanosis or erythema, 1+ edema      Labs:  Results from last 7 days   Lab Units 05/15/25  0606 25  1805   WBC 10*3/mm3 7.27 5.36   HEMOGLOBIN g/dL 10.6* 11.9*   HEMATOCRIT % 31.4* 34.9   PLATELETS 10*3/mm3 167 183     Results from last 7 days   Lab Units 25  1805   GLUCOSE mg/dL 83          Feeding method: Breastfeeding Status: Yes     Blood Type: RH Positive        Assessment & Plan     Principal Problem:    Gestational hypertension, third trimester  Active Problems:    Anxiety and depression    Supervision of normal first pregnancy in third trimester    Hypercholesterolemia    Obesity in pregnancy, antepartum    Prediabetes    Susceptible to varicella (non-immune), currently pregnant    Carrier of spinal muscular atrophy    Pregnancy      Jessica Gauthier is Day 2  post-partum from a  Primary Low Transverse  Section      - Pre-E with SF: Magnesium to continue for 24 hours PP    > Increased to 30  Outgoing call to patient to discuss missed appointment with nutrition today at 10:30am. Patient states she forget she had this scheduled. Patient states she will call us back to reschedule.   procardia XL BID    > Circumcision performed today    Plan:  Continue current care.      Hope Sabillon MD  5/16/2025  15:08 EDT

## 2025-05-16 NOTE — LACTATION NOTE
This note was copied from a baby's chart.  Mom is tired, not feeling well and is now on magnesium. HGP at bedside. Discussed milk production, encouraged pumping working up to x8 per day as she begins to feel better. Mom will call when she is ready to pump. Baby continues to formula feed.

## 2025-05-16 NOTE — PLAN OF CARE
Problem: Adult Inpatient Plan of Care  Goal: Plan of Care Review  Outcome: Progressing  Flowsheets (Taken 5/16/2025 1846)  Outcome Evaluation: Patient continues on magnesium drip. Received procardia at 9AM and 5PM. BP mostly 140s. Incision open to air and clean and dry.  at bedside. Assisted with pumping today. Received tylenol and ibuprofen for pain. Magnesium checks done every hour. Will continue to monitor closely  Goal: Patient-Specific Goal (Individualized)  Outcome: Progressing  Goal: Absence of Hospital-Acquired Illness or Injury  Outcome: Progressing  Intervention: Identify and Manage Fall Risk  Recent Flowsheet Documentation  Taken 5/16/2025 1613 by Callie Gomez RN  Safety Promotion/Fall Prevention:   safety round/check completed   assistive device/personal items within reach   clutter free environment maintained   nonskid shoes/slippers when out of bed  Intervention: Prevent and Manage VTE (Venous Thromboembolism) Risk  Recent Flowsheet Documentation  Taken 5/16/2025 1613 by Callie Gomez RN  VTE Prevention/Management:   SCDs (sequential compression devices) on   bilateral  Goal: Optimal Comfort and Wellbeing  Outcome: Progressing  Intervention: Monitor Pain and Promote Comfort  Recent Flowsheet Documentation  Taken 5/16/2025 1808 by Callie Gomez RN  Pain Management Interventions: pain medication given  Taken 5/16/2025 1613 by Callie Gomez RN  Pain Management Interventions: pain management plan reviewed with patient/caregiver  Intervention: Provide Person-Centered Care  Recent Flowsheet Documentation  Taken 5/16/2025 1613 by Callie Gomez RN  Trust Relationship/Rapport:   care explained   choices provided   thoughts/feelings acknowledged   reassurance provided   questions encouraged   questions answered  Goal: Readiness for Transition of Care  Outcome: Progressing     Problem: Pain Acute  Goal: Optimal Pain Control and Function  Outcome: Progressing  Intervention:  Optimize Psychosocial Wellbeing  Recent Flowsheet Documentation  Taken 2025 1613 by Callie Gomez RN  Diversional Activities: television  Intervention: Develop Pain Management Plan  Recent Flowsheet Documentation  Taken 2025 1808 by Callie Gomez RN  Pain Management Interventions: pain medication given  Taken 2025 1613 by Callie Gomez RN  Pain Management Interventions: pain management plan reviewed with patient/caregiver     Problem:  Fall Injury Risk  Goal: Absence of Fall, Infant Drop and Related Injury  Outcome: Progressing  Intervention: Promote Injury-Free Environment  Recent Flowsheet Documentation  Taken 2025 1613 by Callie Gomez RN  Safety Promotion/Fall Prevention:   safety round/check completed   assistive device/personal items within reach   clutter free environment maintained   nonskid shoes/slippers when out of bed     Problem: Skin Injury Risk Increased  Goal: Skin Health and Integrity  Outcome: Progressing  Intervention: Optimize Skin Protection  Recent Flowsheet Documentation  Taken 2025 1807 by Callie Gomez RN  Activity Management: back to bed  Taken 2025 1803 by Callie Gomez RN  Activity Management: up to bedside commode  Taken 2025 1617 by Callie Gomez RN  Activity Management: back to bed  Taken 2025 1614 by Callie Gomez RN  Activity Management: up to bedside commode     Problem:  Delivery  Goal: Bleeding is Controlled  Outcome: Progressing  Goal: Stable Fetal Wellbeing  Outcome: Progressing  Goal: Absence of Infection Signs and Symptoms  Outcome: Progressing  Goal: Effective Oxygenation and Ventilation  Outcome: Progressing     Problem: Postpartum ( Delivery)  Goal: Successful Parent Role Transition  Outcome: Progressing  Goal: Hemostasis  Outcome: Progressing  Goal: Effective Bowel Elimination  Outcome: Progressing  Goal: Fluid and Electrolyte Balance  Outcome: Progressing  Goal:  Absence of Infection Signs and Symptoms  Outcome: Progressing  Goal: Anesthesia/Sedation Recovery  Outcome: Progressing  Intervention: Optimize Anesthesia Recovery  Recent Flowsheet Documentation  Taken 5/16/2025 1613 by Callie Gomez RN  Safety Promotion/Fall Prevention:   safety round/check completed   assistive device/personal items within reach   clutter free environment maintained   nonskid shoes/slippers when out of bed  Goal: Optimal Pain Control and Function  Outcome: Progressing  Intervention: Prevent or Manage Pain  Recent Flowsheet Documentation  Taken 5/16/2025 1808 by Callie Gomez RN  Pain Management Interventions: pain medication given  Taken 5/16/2025 1613 by Callie Gomez RN  Pain Management Interventions: pain management plan reviewed with patient/caregiver  Goal: Nausea and Vomiting Relief  Outcome: Progressing  Goal: Effective Urinary Elimination  Outcome: Progressing  Goal: Effective Oxygenation and Ventilation  Outcome: Progressing   Goal Outcome Evaluation:              Outcome Evaluation: Patient continues on magnesium drip. Received procardia at 9AM and 5PM. BP mostly 140s. Incision open to air and clean and dry.  at bedside. Assisted with pumping today. Received tylenol and ibuprofen for pain. Magnesium checks done every hour. Will continue to monitor closely                             Problem: Labor  Goal: Hemostasis  Outcome: Unable to Meet  Goal: Stable Fetal Wellbeing  Outcome: Unable to Meet  Goal: Effective Progression to Delivery  Outcome: Unable to Meet  Goal: Absence of Infection Signs and Symptoms  Outcome: Unable to Meet  Goal: Acceptable Pain Control  Outcome: Unable to Meet  Goal: Normal Uterine Contraction Pattern  Outcome: Unable to Meet

## 2025-05-17 VITALS
HEART RATE: 105 BPM | SYSTOLIC BLOOD PRESSURE: 144 MMHG | DIASTOLIC BLOOD PRESSURE: 94 MMHG | RESPIRATION RATE: 16 BRPM | HEIGHT: 64 IN | WEIGHT: 292 LBS | OXYGEN SATURATION: 99 % | TEMPERATURE: 98.4 F | BODY MASS INDEX: 49.85 KG/M2

## 2025-05-17 PROCEDURE — 0503F POSTPARTUM CARE VISIT: CPT | Performed by: OBSTETRICS & GYNECOLOGY

## 2025-05-17 RX ORDER — NIFEDIPINE 30 MG
30 TABLET, EXTENDED RELEASE ORAL EVERY 12 HOURS
Qty: 60 TABLET | Refills: 2 | Status: SHIPPED | OUTPATIENT
Start: 2025-05-17

## 2025-05-17 RX ORDER — IBUPROFEN 600 MG/1
600 TABLET, FILM COATED ORAL EVERY 6 HOURS PRN
Qty: 30 TABLET | Refills: 1 | Status: SHIPPED | OUTPATIENT
Start: 2025-05-17

## 2025-05-17 RX ADMIN — IBUPROFEN 600 MG: 600 TABLET ORAL at 00:38

## 2025-05-17 RX ADMIN — ACETAMINOPHEN 650 MG: 325 TABLET, FILM COATED ORAL at 09:34

## 2025-05-17 RX ADMIN — IBUPROFEN 600 MG: 600 TABLET ORAL at 12:20

## 2025-05-17 RX ADMIN — NIFEDIPINE 30 MG: 30 TABLET, EXTENDED RELEASE ORAL at 08:48

## 2025-05-17 RX ADMIN — VARICELLA VIRUS VACCINE LIVE 0.5 ML: 1350 INJECTION, POWDER, LYOPHILIZED, FOR SUSPENSION SUBCUTANEOUS at 12:09

## 2025-05-17 RX ADMIN — IBUPROFEN 600 MG: 600 TABLET ORAL at 06:34

## 2025-05-17 RX ADMIN — ACETAMINOPHEN 650 MG: 325 TABLET, FILM COATED ORAL at 03:27

## 2025-05-17 RX ADMIN — PRENATAL VITAMINS-IRON FUMARATE 27 MG IRON-FOLIC ACID 0.8 MG TABLET 1 TABLET: at 08:48

## 2025-05-17 NOTE — PROGRESS NOTES
[facility]   Obstetrics and Gynecology     2025    Name:Jessica Gauthier    MR#:[id number]     Progress Note:  Post-Op    HD:5    Subjective   24 y.o. yo Female  s/p CS at 38w0d doing well. Pain well controlled. Tolerating regular diet and having flatus. Lochia normal.  Patient and  are very anxious and desiring discharge today.  Patient reports being Homero is continuing to make her anxious and rates her blood pressure even more.  She denies headache or visual changes.  She reports she does have a way to check her blood pressure at home and also reports she would like to start something for anxiety.    Patient Active Problem List   Diagnosis    PMS (premenstrual syndrome)    Screening, ischemic heart disease    Fatigue    Anxiety and depression    Supervision of normal first pregnancy in third trimester    Hypercholesterolemia    Obesity in pregnancy, antepartum    Prediabetes    Susceptible to varicella (non-immune), currently pregnant    Carrier of spinal muscular atrophy    Carpal tunnel syndrome during pregnancy    Pregnancy    Gestational hypertension, third trimester        Objective    Vitals  Temp:  Temp:  [97.3 °F (36.3 °C)-98.6 °F (37 °C)] 98.4 °F (36.9 °C)  Temp src: Oral  BP:  BP: (128-154)/(64-95) 144/94  Pulse:  Heart Rate:  [105-134] 105  RR:   Resp:  [16-20] 16  Weight: 132 kg (292 lb)  BMI:  Body mass index is 50.12 kg/m².    Physical Exam  Vitals and nursing note reviewed.   Constitutional:       Appearance: Normal appearance. She is obese.   Pulmonary:      Effort: Pulmonary effort is normal.   Abdominal:      General: There is no distension.      Palpations: Abdomen is soft.      Tenderness: There is no abdominal tenderness.      Comments: Inc C/D/I    Neurological:      Mental Status: She is alert and oriented to person, place, and time.   Psychiatric:         Mood and Affect: Mood normal.         Behavior: Behavior normal.         I/O last 3  completed shifts:  In: 4368.6 [P.O.:1050; I.V.:3318.6]  Out: 9350 [Urine:9350]    LABS:  Results from last 7 days   Lab Units 05/15/25  0606 25  1805   WBC 10*3/mm3 7.27 5.36   HEMOGLOBIN g/dL 10.6* 11.9*   HEMATOCRIT % 31.4* 34.9   PLATELETS 10*3/mm3 167 183     Results from last 7 days   Lab Units 25  1805   SODIUM mmol/L 135*   POTASSIUM mmol/L 3.8   CHLORIDE mmol/L 106   CO2 mmol/L 18.9*   BUN mg/dL 6   CREATININE mg/dL 0.67   CALCIUM mg/dL 9.8   BILIRUBIN mg/dL <0.2   ALK PHOS U/L 117   ALT (SGPT) U/L 15   AST (SGOT) U/L 27   GLUCOSE mg/dL 83       Infant: male      Assessment    1.   SECTION PRIMARY 3 Days Post-Op    Gestational hypertension, third trimester    Anxiety and depression    Supervision of normal first pregnancy in third trimester    Hypercholesterolemia    Obesity in pregnancy, antepartum    Prediabetes    Susceptible to varicella (non-immune), currently pregnant    Carrier of spinal muscular atrophy    Pregnancy      Plan:  Discharge today   Patient requests discharge    Alison Lennon MD  2025 11:52 EDT

## 2025-05-17 NOTE — DISCHARGE SUMMARY
[facility]   Obstetrics and Gynecology    Section Discharge Summary    Date of Admission: 2025  Date of Discharge: 2025       Patient: Jessica Gauthier      MR#:[id number]    Surgeon/OB: Rachel Wilcox MD    Primary OB:  Same    Discharge Diagnosis:    section at 38w0d, uncomplicated recovery    Gestational hypertension, third trimester    Anxiety and depression    Supervision of normal first pregnancy in third trimester    Hypercholesterolemia    Obesity in pregnancy, antepartum    Prediabetes    Susceptible to varicella (non-immune), currently pregnant    Carrier of spinal muscular atrophy    Pregnancy    Preeclampsia in postpartum period        Procedures:  , Low Transverse    2025   6:09 AM     Anesthesia:  Epidural;Spinal    Hospital Course  Patient is a 24 y.o. female  at 38w0d status post  section with uneventful postoperative recovery.  Patient was advanced to regular diet on postoperative day#1.  On postoperative day 2 patient had severe range pressures and was given magnesium for 24 hours with improvement in her blood pressures.  She was then started on Procardia XL 30 mg twice daily and blood pressures remained stable.  On discharge, ambulating, tolerating a regular diet without any difficulties and her incision is dry, clean and intact.     Infant:   male fetus 3020 g (6 lb 10.5 oz) with Apgar scores of 8 , 8  at five minutes.    Condition on Discharge:  Stable    Vital Signs  Temp:  [97.3 °F (36.3 °C)-98.6 °F (37 °C)] 98.4 °F (36.9 °C)  Heart Rate:  [105-134] 105  Resp:  [16-20] 16  BP: (128-154)/(64-95) 144/94    Results from last 7 days   Lab Units 05/15/25  0606 25  1805   WBC 10*3/mm3 7.27 5.36   HEMOGLOBIN g/dL 10.6* 11.9*   HEMATOCRIT % 31.4* 34.9   PLATELETS 10*3/mm3 167 183     Results from last 7 days   Lab Units 25  1805   SODIUM mmol/L 135*   POTASSIUM mmol/L 3.8   CHLORIDE mmol/L 106   CO2 mmol/L 18.9*   BUN mg/dL  6   CREATININE mg/dL 0.67   CALCIUM mg/dL 9.8   BILIRUBIN mg/dL <0.2   ALK PHOS U/L 117   ALT (SGPT) U/L 15   AST (SGOT) U/L 27   GLUCOSE mg/dL 83         Discharge Disposition  Home or Self Care    Discharge Medications     Your medication list        START taking these medications        Instructions Last Dose Given Next Dose Due   ibuprofen 600 MG tablet  Commonly known as: ADVIL,MOTRIN      Take 1 tablet by mouth Every 6 (Six) Hours As Needed for Mild Pain.       NIFEdipine CC 30 MG 24 hr tablet  Commonly known as: ADALAT CC      Take 1 tablet by mouth Every 12 (Twelve) Hours.       sertraline 50 MG tablet  Commonly known as: Zoloft      Take 1 tablet by mouth Daily.              CONTINUE taking these medications        Instructions Last Dose Given Next Dose Due   aspirin 81 MG EC tablet      Take 1 tablet by mouth Daily.       PRENATAL VITAMIN PO      Take  by mouth.              STOP taking these medications      sulfamethoxazole-trimethoprim 800-160 MG per tablet  Commonly known as: Bactrim DS                  Where to Get Your Medications        These medications were sent to [facility] [id number] - [facility] [address] [facility] [phone] [facility] [phone]   [address] , [place] [id number]      Phone: [phone]   ibuprofen 600 MG tablet  NIFEdipine CC 30 MG 24 hr tablet  sertraline 50 MG tablet           Discharge Diet: Regular    Follow-up Appointments  Future Appointments   Date Time Provider Department Center   5/23/2025 11:45 AM Rachel Wilcox MD [facility]  [facility]     Additional Instructions for the Follow-ups that You Need to Schedule       Call MD for problems / concerns.   As directed      Instructions: Call for temp>101, vaginal bleeding greater than one pad/hour, severe pain, pressure 160 or higher, diastolic blood pressure 105 or higher, headache, visual changes or other concerns.    Order Comments: Instructions: Call for temp>101, vaginal  bleeding greater than one pad/hour, severe pain, pressure 160 or higher, diastolic blood pressure 105 or higher, headache, visual changes or other concerns.         Discharge Follow-up with Specialty: Dr. Wilcox; 6 Days   As directed      Specialty: Dr. Wilcox   Follow Up: 6 Days                Prenatal labs/vax:   Immunization History   Administered Date(s) Administered    COVID-19 (MODERNA) 1st,2nd,3rd Dose Monovalent 07/17/2021, 08/14/2021    Fluzone  >6mos 10/15/2024    Meningococcal MCV4P (Menactra) 06/06/2018    Tdap 03/04/2025       External Prenatal Results       Pregnancy Outside Results - Transcribed From Office Records - See Scanned Records For Details       Test Value Date Time    ABO  O  05/12/25 1805    Rh  Positive  05/12/25 1805    Antibody Screen  Negative  05/12/25 1805       Negative  10/15/24 1056    Varicella IgG  Non Reactive  10/15/24 1056    Rubella  3.01 index 10/15/24 1056    Hgb  10.6 g/dL 05/15/25 0606       11.9 g/dL 05/12/25 1805       12.5 g/dL 04/25/25 1426       11.7 g/dL 03/04/25 1045       14.0 g/dL 10/15/24 1056    Hct  31.4 % 05/15/25 0606       34.9 % 05/12/25 1805       37.7 % 04/25/25 1426       36.7 % 03/04/25 1045       43.3 % 10/15/24 1056    HgB A1c   5.8 % 10/15/24 1056    1h GTT  98 mg/dL 03/04/25 1045       109 mg/dL 11/12/24 1041    3h GTT Fasting       3h GTT 1 hour       3h GTT 2 hour       3h GTT 3 hour        Gonorrhea (discrete)  Negative  10/15/24 1001    Chlamydia (discrete)  Negative  10/15/24 1001    RPR  Non Reactive  10/15/24 1056    Syphils cascade: TP-Ab (FTA)  Non-Reactive  05/12/25 1805       Non Reactive  03/04/25 1045    TP-Ab  Non-Reactive  05/12/25 1805       Non Reactive  03/04/25 1045    TP-Ab (EIA)       TPPA       HBsAg  Negative  10/15/24 1056    Herpes Simplex Virus PCR       Herpes Simplex VIrus Culture       HIV  Non Reactive  10/15/24 1056    Hep C RNA Quant PCR       Hep C Antibody  CANCELED  10/15/24 1056    AFP       NIPT ^ low risk   11/12/24     Cystic Fibrosis (Jose David)  Negative  11/12/24 0857    Cystic Fibroisis        Spinal Muscular atrophy  Positive  11/12/24 0857    Fragile X       Group B Strep  Negative  04/25/25     GBS Susceptibility to Clindamycin       GBS Susceptibility to Erythromycin       Fetal Fibronectin       Genetic Testing, Maternal Blood                 Drug Screening       Test Value Date Time    Urine Drug Screen       Amphetamine Screen       Barbiturate Screen       Benzodiazepine Screen       Methadone Screen       Phencyclidine Screen       Opiates Screen       THC Screen       Cocaine Screen       Propoxyphene Screen       Buprenorphine Screen       Methamphetamine Screen       Oxycodone Screen       Tricyclic Antidepressants Screen                 Legend    ^: Historical                              Alison Lennon MD  5/17/2025  11:56 EDT

## 2025-05-18 ENCOUNTER — MATERNAL SCREENING (OUTPATIENT)
Dept: CALL CENTER | Facility: HOSPITAL | Age: 25
End: 2025-05-18
Payer: COMMERCIAL

## 2025-05-18 NOTE — OUTREACH NOTE
Maternal Screening Survey      Flowsheet Row Responses   Eligibility Eligible   Prep survey completed? Yes   Facility patient discharged from? [facility] - Registered Nurse

## 2025-05-23 ENCOUNTER — POSTPARTUM VISIT (OUTPATIENT)
Dept: OBSTETRICS AND GYNECOLOGY | Age: 25
End: 2025-05-23
Payer: COMMERCIAL

## 2025-05-23 VITALS
HEIGHT: 64 IN | BODY MASS INDEX: 45.58 KG/M2 | DIASTOLIC BLOOD PRESSURE: 70 MMHG | SYSTOLIC BLOOD PRESSURE: 130 MMHG | WEIGHT: 267 LBS

## 2025-05-23 DIAGNOSIS — F32.A ANXIETY AND DEPRESSION: ICD-10-CM

## 2025-05-23 DIAGNOSIS — Z98.891 S/P CESAREAN SECTION: ICD-10-CM

## 2025-05-23 DIAGNOSIS — F41.9 ANXIETY AND DEPRESSION: ICD-10-CM

## 2025-05-23 PROBLEM — Z34.90 PREGNANCY: Status: RESOLVED | Noted: 2025-05-12 | Resolved: 2025-05-23

## 2025-05-23 PROBLEM — Z34.03 SUPERVISION OF NORMAL FIRST PREGNANCY IN THIRD TRIMESTER: Status: RESOLVED | Noted: 2024-10-15 | Resolved: 2025-05-23

## 2025-05-23 PROBLEM — G56.00 CARPAL TUNNEL SYNDROME DURING PREGNANCY: Status: RESOLVED | Noted: 2025-02-04 | Resolved: 2025-05-23

## 2025-05-23 PROBLEM — E66.01 MORBID OBESITY WITH BMI OF 45.0-49.9, ADULT: Status: ACTIVE | Noted: 2024-10-15

## 2025-05-23 PROBLEM — O26.899 CARPAL TUNNEL SYNDROME DURING PREGNANCY: Status: RESOLVED | Noted: 2025-02-04 | Resolved: 2025-05-23

## 2025-05-23 PROBLEM — Z28.39 SUSCEPTIBLE TO VARICELLA (NON-IMMUNE), CURRENTLY PREGNANT: Status: RESOLVED | Noted: 2024-10-16 | Resolved: 2025-05-23

## 2025-05-23 PROBLEM — O09.899 SUSCEPTIBLE TO VARICELLA (NON-IMMUNE), CURRENTLY PREGNANT: Status: RESOLVED | Noted: 2024-10-16 | Resolved: 2025-05-23

## 2025-05-23 NOTE — ASSESSMENT & PLAN NOTE
BP normotensive today. Asymptomatic. Discussed plan to continue for additional 4 weeks then can likely stop medication. Discussed increased risk of CHTN and potential for long-term medication requirement.

## 2025-05-23 NOTE — PROGRESS NOTES
[facility]  Obstetrics and Gynecology   Postpartum Visit    2025    Patient: Jessica Gauthier          MR#:[id number]    History of Present Illness    Chief Complaint   Patient presents with    Postpartum Care     1wk pp blood pressure check, pt has no complaints today     25 y.o. female  status post pCS  for failed IOL presents for postpartum visit. Pain is controlled and minimal. Mood stable - much improved on zoloft and with being out of hospital.  Breast-feeding without issue.  Bleeding minimal.  No intercourse since delivery. Enjoying motherhood thus far    Continues to take procardia      Postpartum inventory   Infant feeding: (Patient-Rptd) Breast  Postpartum pain: (Patient-Rptd) Mild (pain 1-3)  Vaginal bleeding : (Patient-Rptd) Mostly light - only using liner  Depression/Anxiety: (Patient-Rptd) Minimal anxiety  Contracepton: (Patient-Rptd) Abstinence    Postpartum Depression: Low Risk  (2025)    [place]  Depression Scale     Last EPDS Total Score: 1     Last EPDS Self Harm Result: Never     Contraception: undecided   Vaccines: s/p varivax prior to discharge, RI, will discuss gardasil next visit  Pap: 2022 NILM  ________________________________________  Patient Active Problem List   Diagnosis    PMS (premenstrual syndrome)    Screening, ischemic heart disease    Fatigue    Anxiety and depression    Hypercholesterolemia    Morbid obesity with BMI of 45.0-49.9, adult    Prediabetes    Carrier of spinal muscular atrophy    Preeclampsia in postpartum period     Past Medical History:   Diagnosis Date    Anxiety 2020    regulating / getting better    Depression     Not currently. not diagnosed    Endometriosis     diagnosed aug 2023. laparoscopic surgery    Fibroid     found in aug 2023 in laparoscopy    Hyperlipidemia Always seen this in my results from a young age    Migraine     chronic. not medicated    PMS (premenstrual syndrome)      Past Surgical History:   Procedure  "Laterality Date     SECTION N/A 2025    Procedure:  SECTION PRIMARY;  Surgeon: Rachel Wilcox MD;  Location: [facility] LABOR DELIVERY;  Service: Obstetrics/Gynecology;  Laterality: N/A;    FULGURATION ENDOMETRIOSIS  aug 23    laparoscopy    HYSTEROSCOPY      WISDOM TOOTH EXTRACTION       Social History     Tobacco Use    Smoking status: Never   Vaping Use    Vaping status: Never Used   Substance Use Topics    Alcohol use: Not Currently    Drug use: Never     has a current medication list which includes the following prescription(s): ibuprofen, nifedipine cc, prenatal vit-fe fumarate-fa, sertraline, and aspirin.  ________________________________________         Review of Systems   Gastrointestinal:  Negative for abdominal pain, constipation and nausea.   Psychiatric/Behavioral:  Negative for dysphoric mood.        Objective     /70   Ht 162.6 cm (64.02\")   Wt 121 kg (267 lb)   LMP 2024 (Exact Date)   Breastfeeding Yes   BMI 45.81 kg/m²    BP Readings from Last 3 Encounters:   25 130/70   25 144/94   25 132/82      Wt Readings from Last 3 Encounters:   25 121 kg (267 lb)   25 132 kg (292 lb)   25 133 kg (292 lb 9.6 oz)      BMI: Estimated body mass index is 45.81 kg/m² as calculated from the following:    Height as of this encounter: 162.6 cm (64.02\").    Weight as of this encounter: 121 kg (267 lb).    Physical Exam  Vitals and nursing note reviewed.   Constitutional:       General: She is not in acute distress.     Appearance: Normal appearance.   Cardiovascular:      Pulses: Normal pulses.   Pulmonary:      Effort: Pulmonary effort is normal.   Abdominal:      General: There is no distension.      Palpations: Abdomen is soft.      Tenderness: There is no abdominal tenderness. There is no guarding or rebound.      Comments: Incision c/d/i   Musculoskeletal:         General: No swelling or tenderness.      Right lower leg: No edema.      Left " lower leg: No edema.   Neurological:      Mental Status: She is alert and oriented to person, place, and time.   Psychiatric:         Mood and Affect: Mood normal.         Behavior: Behavior normal.       Assessment:  25 y.o. female  status post pCS  presents for postpartum visit.         Postpartum follow-up  Patient and infant both doing well. Breastfeeding is going better than expected. Discussed multiple poor experiences during admission which contributed to anxiety and desire for discharge.        Preeclampsia in postpartum period  BP normotensive today. Asymptomatic. Discussed plan to continue for additional 4 weeks then can likely stop medication. Discussed increased risk of CHTN and potential for long-term medication requirement.         S/P  section  Incision healing well       Anxiety and depression  Mood doing well with zoloft           Plan:  Return in about 4 weeks (around 2025) for routine PP, pap.      Rachel Wilcox MD  2025 12:07 EDT

## 2025-05-27 ENCOUNTER — MATERNAL SCREENING (OUTPATIENT)
Dept: CALL CENTER | Facility: HOSPITAL | Age: 25
End: 2025-05-27
Payer: COMMERCIAL

## 2025-05-27 NOTE — OUTREACH NOTE
Maternal Screening Survey      Flowsheet Row Responses   Facility patient discharged from? [place]   Attempt successful? No   Unsuccessful attempts Attempt 2              [facility] - Registered Nurse

## 2025-05-27 NOTE — OUTREACH NOTE
Maternal Screening Survey      Flowsheet Row Responses   Facility patient discharged from? [place]   Attempt successful? No   Unsuccessful attempts Attempt 1              [facility] LACEY - Registered Nurse

## 2025-05-28 ENCOUNTER — MATERNAL SCREENING (OUTPATIENT)
Dept: CALL CENTER | Facility: HOSPITAL | Age: 25
End: 2025-05-28
Payer: COMMERCIAL

## 2025-08-19 RX ORDER — NIFEDIPINE 30 MG
30 TABLET, EXTENDED RELEASE ORAL EVERY 12 HOURS
Qty: 60 TABLET | Refills: 2 | OUTPATIENT
Start: 2025-08-19

## 2025-08-24 ENCOUNTER — TELEPHONE (OUTPATIENT)
Dept: OBSTETRICS AND GYNECOLOGY | Age: 25
End: 2025-08-24

## 2025-08-24 ENCOUNTER — E-VISIT (OUTPATIENT)
Dept: ADMINISTRATIVE | Facility: OTHER | Age: 25
End: 2025-08-24

## (undated) DEVICE — SUT MNCRYL 0 CT1 36IN UD MCP946H

## (undated) DEVICE — SUT PDS LP 0 CTX VIO 60IN Z990G

## (undated) DEVICE — GLV SURG BIOGEL LTX PF 6 1/2

## (undated) DEVICE — SUT MONOCRYL PLS ANTIB UND 3/0  PS1 27IN

## (undated) DEVICE — EXTRA LARGE, DISPOSABLE C-SECTION PROTECTOR - RETRACTOR: Brand: ALEXIS ® O C-SECTION PROTECTOR - RETRACTOR

## (undated) DEVICE — ANTIBACTERIAL UNDYED BRAIDED (POLYGLACTIN 910), SYNTHETIC ABSORBABLE SUTURE: Brand: COATED VICRYL

## (undated) DEVICE — DRSNG PAD ABD 8X10IN STRL